# Patient Record
Sex: MALE | Race: WHITE | NOT HISPANIC OR LATINO | Employment: OTHER | URBAN - METROPOLITAN AREA
[De-identification: names, ages, dates, MRNs, and addresses within clinical notes are randomized per-mention and may not be internally consistent; named-entity substitution may affect disease eponyms.]

---

## 2017-02-13 ENCOUNTER — OUTSIDE FACILITY SERVICE (OUTPATIENT)
Dept: CARDIAC SURGERY | Facility: CLINIC | Age: 57
End: 2017-02-13

## 2017-02-13 PROCEDURE — G0180 MD CERTIFICATION HHA PATIENT: HCPCS | Performed by: THORACIC SURGERY (CARDIOTHORACIC VASCULAR SURGERY)

## 2018-07-03 ENCOUNTER — HOSPITAL ENCOUNTER (OUTPATIENT)
Age: 58
End: 2018-07-03
Payer: MEDICARE

## 2018-07-03 DIAGNOSIS — Z01.818: Primary | ICD-10-CM

## 2018-07-03 DIAGNOSIS — L03.90: ICD-10-CM

## 2018-07-03 DIAGNOSIS — L02.91: ICD-10-CM

## 2018-07-03 LAB
ANION GAP SERPL CALC-SCNC: 11.8 MEQ/L (ref 5–15)
BUN SERPL-MCNC: 4 MG/DL (ref 7–18)
CALCIUM SPEC-MCNC: 8.6 MG/DL (ref 8.5–10.1)
CHLORIDE SPEC-SCNC: 97 MMOL/L (ref 98–107)
CO2 SERPL-SCNC: 32 MMOL/L (ref 21–32)
CREAT BLD-SCNC: 0.96 MG/DL (ref 0.7–1.3)
ESTIMATED GLOMERULAR FILT RATE: 81 ML/MIN (ref 60–?)
GFR (AFRICAN AMERICAN): 98 ML/MIN (ref 60–?)
GLUCOSE: 294 MG/DL (ref 74–106)
HCT VFR BLD CALC: 53.5 % (ref 42–52)
HGB BLD-MCNC: 16.6 G/DL (ref 14.1–18)
MCHC RBC-ENTMCNC: 31 G/DL (ref 31.8–35.4)
MCV RBC: 94.4 FL (ref 80–94)
MEAN CORPUSCULAR HEMOGLOBIN: 29.3 PG (ref 27–31.2)
PLATELET # BLD: 220 K/MM3 (ref 142–424)
POTASSIUM: 3.8 MMOL/L (ref 3.5–5.1)
RBC # BLD AUTO: 5.67 M/MM3 (ref 4.6–6.2)
SODIUM SPEC-SCNC: 137 MMOL/L (ref 136–145)
WBC # BLD AUTO: 9.6 K/MM3 (ref 4.8–10.8)

## 2018-07-03 PROCEDURE — 80048 BASIC METABOLIC PNL TOTAL CA: CPT

## 2018-07-03 PROCEDURE — 36415 COLL VENOUS BLD VENIPUNCTURE: CPT

## 2018-07-03 PROCEDURE — 85025 COMPLETE CBC W/AUTO DIFF WBC: CPT

## 2018-07-03 PROCEDURE — 93005 ELECTROCARDIOGRAM TRACING: CPT

## 2018-11-24 ENCOUNTER — LAB REQUISITION (OUTPATIENT)
Dept: LAB | Facility: HOSPITAL | Age: 58
End: 2018-11-24

## 2018-11-24 DIAGNOSIS — Z00.00 ROUTINE GENERAL MEDICAL EXAMINATION AT A HEALTH CARE FACILITY: ICD-10-CM

## 2018-11-24 LAB
ALBUMIN SERPL-MCNC: 4.27 G/DL (ref 3.2–4.8)
ALBUMIN SERPL-MCNC: 4.27 G/DL (ref 3.2–4.8)
ALBUMIN/GLOB SERPL: 1.1 G/DL (ref 1.5–2.5)
ALP SERPL-CCNC: 128 U/L (ref 25–100)
ALP SERPL-CCNC: 128 U/L (ref 25–100)
ALT SERPL W P-5'-P-CCNC: 13 U/L (ref 7–40)
ALT SERPL W P-5'-P-CCNC: 13 U/L (ref 7–40)
ANION GAP SERPL CALCULATED.3IONS-SCNC: 4 MMOL/L (ref 3–11)
AST SERPL-CCNC: 15 U/L (ref 0–33)
AST SERPL-CCNC: 15 U/L (ref 0–33)
BASOPHILS # BLD AUTO: 0.06 10*3/MM3 (ref 0–0.2)
BASOPHILS NFR BLD AUTO: 0.5 % (ref 0–1)
BILIRUB CONJ SERPL-MCNC: 0.1 MG/DL (ref 0–0.2)
BILIRUB INDIRECT SERPL-MCNC: 0.3 MG/DL (ref 0.1–1.1)
BILIRUB SERPL-MCNC: 0.4 MG/DL (ref 0.3–1.2)
BILIRUB SERPL-MCNC: 0.4 MG/DL (ref 0.3–1.2)
BUN BLD-MCNC: 9 MG/DL (ref 9–23)
BUN/CREAT SERPL: 10.7 (ref 7–25)
CALCIUM SPEC-SCNC: 9.7 MG/DL (ref 8.7–10.4)
CHLORIDE SERPL-SCNC: 101 MMOL/L (ref 99–109)
CO2 SERPL-SCNC: 32 MMOL/L (ref 20–31)
CREAT BLD-MCNC: 0.84 MG/DL (ref 0.6–1.3)
DEPRECATED RDW RBC AUTO: 49.8 FL (ref 37–54)
EOSINOPHIL # BLD AUTO: 0.15 10*3/MM3 (ref 0–0.3)
EOSINOPHIL NFR BLD AUTO: 1.3 % (ref 0–3)
ERYTHROCYTE [DISTWIDTH] IN BLOOD BY AUTOMATED COUNT: 14 % (ref 11.3–14.5)
GFR SERPL CREATININE-BSD FRML MDRD: 94 ML/MIN/1.73
GLOBULIN UR ELPH-MCNC: 3.9 GM/DL
GLUCOSE BLD-MCNC: 211 MG/DL (ref 70–100)
HCT VFR BLD AUTO: 54.4 % (ref 38.9–50.9)
HGB BLD-MCNC: 18.2 G/DL (ref 13.1–17.5)
IMM GRANULOCYTES # BLD: 0.04 10*3/MM3 (ref 0–0.03)
IMM GRANULOCYTES NFR BLD: 0.4 % (ref 0–0.6)
LYMPHOCYTES # BLD AUTO: 3.22 10*3/MM3 (ref 0.6–4.8)
LYMPHOCYTES NFR BLD AUTO: 28.6 % (ref 24–44)
MCH RBC QN AUTO: 32.6 PG (ref 27–31)
MCHC RBC AUTO-ENTMCNC: 33.5 G/DL (ref 32–36)
MCV RBC AUTO: 97.3 FL (ref 80–99)
MONOCYTES # BLD AUTO: 0.74 10*3/MM3 (ref 0–1)
MONOCYTES NFR BLD AUTO: 6.6 % (ref 0–12)
NEUTROPHILS # BLD AUTO: 7.08 10*3/MM3 (ref 1.5–8.3)
NEUTROPHILS NFR BLD AUTO: 63 % (ref 41–71)
PLATELET # BLD AUTO: 240 10*3/MM3 (ref 150–450)
PMV BLD AUTO: 11.3 FL (ref 6–12)
POTASSIUM BLD-SCNC: 4.6 MMOL/L (ref 3.5–5.5)
PROT SERPL-MCNC: 8.2 G/DL (ref 5.7–8.2)
PROT SERPL-MCNC: 8.2 G/DL (ref 5.7–8.2)
RBC # BLD AUTO: 5.59 10*6/MM3 (ref 4.2–5.76)
SODIUM BLD-SCNC: 137 MMOL/L (ref 132–146)
WBC NRBC COR # BLD: 11.25 10*3/MM3 (ref 3.5–10.8)

## 2018-11-24 PROCEDURE — 80053 COMPREHEN METABOLIC PANEL: CPT | Performed by: PSYCHIATRY & NEUROLOGY

## 2018-11-24 PROCEDURE — 85025 COMPLETE CBC W/AUTO DIFF WBC: CPT | Performed by: PSYCHIATRY & NEUROLOGY

## 2018-11-24 PROCEDURE — 80076 HEPATIC FUNCTION PANEL: CPT | Performed by: PSYCHIATRY & NEUROLOGY

## 2018-12-02 ENCOUNTER — HOSPITAL ENCOUNTER (OUTPATIENT)
Facility: HOSPITAL | Age: 58
Setting detail: OBSERVATION
Discharge: HOME | End: 2018-12-03
Attending: EMERGENCY MEDICINE | Admitting: EMERGENCY MEDICINE

## 2018-12-02 ENCOUNTER — APPOINTMENT (OUTPATIENT)
Dept: CT IMAGING | Facility: HOSPITAL | Age: 58
End: 2018-12-02

## 2018-12-02 ENCOUNTER — APPOINTMENT (OUTPATIENT)
Dept: GENERAL RADIOLOGY | Facility: HOSPITAL | Age: 58
End: 2018-12-02

## 2018-12-02 DIAGNOSIS — R09.02 HYPOXIA: ICD-10-CM

## 2018-12-02 DIAGNOSIS — R07.9 CHEST PAIN, UNSPECIFIED TYPE: Primary | ICD-10-CM

## 2018-12-02 DIAGNOSIS — J44.9 CHRONIC OBSTRUCTIVE PULMONARY DISEASE, UNSPECIFIED COPD TYPE (HCC): ICD-10-CM

## 2018-12-02 PROBLEM — E66.2 OBESITY HYPOVENTILATION SYNDROME (HCC): Status: RESOLVED | Noted: 2018-12-02 | Resolved: 2018-12-02

## 2018-12-02 PROBLEM — J43.2 CENTRILOBULAR EMPHYSEMA (HCC): Status: RESOLVED | Noted: 2018-12-02 | Resolved: 2018-12-02

## 2018-12-02 PROBLEM — J41.1 MUCOPURULENT CHRONIC BRONCHITIS (HCC): Status: ACTIVE | Noted: 2018-12-02

## 2018-12-02 PROBLEM — F11.10 OPIOID ABUSE: Status: ACTIVE | Noted: 2018-12-02

## 2018-12-02 PROBLEM — J44.1 COPD WITH ACUTE EXACERBATION (HCC): Status: ACTIVE | Noted: 2018-12-02

## 2018-12-02 PROBLEM — E66.2 OBESITY HYPOVENTILATION SYNDROME (HCC): Status: ACTIVE | Noted: 2018-12-02

## 2018-12-02 PROBLEM — J44.1 COPD EXACERBATION (HCC): Status: ACTIVE | Noted: 2018-12-02

## 2018-12-02 PROBLEM — E11.9 DM2 (DIABETES MELLITUS, TYPE 2): Status: ACTIVE | Noted: 2018-12-02

## 2018-12-02 PROBLEM — K21.9 GERD (GASTROESOPHAGEAL REFLUX DISEASE): Status: ACTIVE | Noted: 2018-12-02

## 2018-12-02 LAB
ALBUMIN SERPL-MCNC: 4.07 G/DL (ref 3.2–4.8)
ALBUMIN/GLOB SERPL: 1.2 G/DL (ref 1.5–2.5)
ALP SERPL-CCNC: 102 U/L (ref 25–100)
ALT SERPL W P-5'-P-CCNC: 13 U/L (ref 7–40)
ANION GAP SERPL CALCULATED.3IONS-SCNC: 8 MMOL/L (ref 3–11)
AST SERPL-CCNC: 18 U/L (ref 0–33)
BASOPHILS # BLD AUTO: 0.08 10*3/MM3 (ref 0–0.2)
BASOPHILS NFR BLD AUTO: 0.7 % (ref 0–1)
BILIRUB SERPL-MCNC: 0.4 MG/DL (ref 0.3–1.2)
BNP SERPL-MCNC: 2 PG/ML (ref 0–100)
BUN BLD-MCNC: 11 MG/DL (ref 9–23)
BUN/CREAT SERPL: 10.5 (ref 7–25)
CALCIUM SPEC-SCNC: 9 MG/DL (ref 8.7–10.4)
CHLORIDE SERPL-SCNC: 103 MMOL/L (ref 99–109)
CO2 SERPL-SCNC: 24 MMOL/L (ref 20–31)
CREAT BLD-MCNC: 1.05 MG/DL (ref 0.6–1.3)
DEPRECATED RDW RBC AUTO: 49.1 FL (ref 37–54)
EOSINOPHIL # BLD AUTO: 0.3 10*3/MM3 (ref 0–0.3)
EOSINOPHIL NFR BLD AUTO: 2.5 % (ref 0–3)
ERYTHROCYTE [DISTWIDTH] IN BLOOD BY AUTOMATED COUNT: 13.8 % (ref 11.3–14.5)
GFR SERPL CREATININE-BSD FRML MDRD: 73 ML/MIN/1.73
GLOBULIN UR ELPH-MCNC: 3.5 GM/DL
GLUCOSE BLD-MCNC: 219 MG/DL (ref 70–100)
GLUCOSE BLDC GLUCOMTR-MCNC: 322 MG/DL (ref 70–130)
HCT VFR BLD AUTO: 54.3 % (ref 38.9–50.9)
HGB BLD-MCNC: 17.9 G/DL (ref 13.1–17.5)
HOLD SPECIMEN: NORMAL
HOLD SPECIMEN: NORMAL
IMM GRANULOCYTES # BLD: 0.05 10*3/MM3 (ref 0–0.03)
IMM GRANULOCYTES NFR BLD: 0.4 % (ref 0–0.6)
LIPASE SERPL-CCNC: 30 U/L (ref 6–51)
LYMPHOCYTES # BLD AUTO: 3.44 10*3/MM3 (ref 0.6–4.8)
LYMPHOCYTES NFR BLD AUTO: 28.7 % (ref 24–44)
MCH RBC QN AUTO: 32 PG (ref 27–31)
MCHC RBC AUTO-ENTMCNC: 33 G/DL (ref 32–36)
MCV RBC AUTO: 97.1 FL (ref 80–99)
MONOCYTES # BLD AUTO: 0.42 10*3/MM3 (ref 0–1)
MONOCYTES NFR BLD AUTO: 3.5 % (ref 0–12)
NEUTROPHILS # BLD AUTO: 7.76 10*3/MM3 (ref 1.5–8.3)
NEUTROPHILS NFR BLD AUTO: 64.6 % (ref 41–71)
PLATELET # BLD AUTO: 213 10*3/MM3 (ref 150–450)
PMV BLD AUTO: 10.4 FL (ref 6–12)
POTASSIUM BLD-SCNC: 4.4 MMOL/L (ref 3.5–5.5)
PROT SERPL-MCNC: 7.6 G/DL (ref 5.7–8.2)
RBC # BLD AUTO: 5.59 10*6/MM3 (ref 4.2–5.76)
SODIUM BLD-SCNC: 135 MMOL/L (ref 132–146)
TROPONIN I SERPL-MCNC: 0 NG/ML (ref 0–0.07)
TROPONIN I SERPL-MCNC: <0.006 NG/ML
WBC NRBC COR # BLD: 12 10*3/MM3 (ref 3.5–10.8)
WHOLE BLOOD HOLD SPECIMEN: NORMAL
WHOLE BLOOD HOLD SPECIMEN: NORMAL

## 2018-12-02 PROCEDURE — 84484 ASSAY OF TROPONIN QUANT: CPT | Performed by: EMERGENCY MEDICINE

## 2018-12-02 PROCEDURE — 63710000001 INSULIN LISPRO (HUMAN) PER 5 UNITS: Performed by: FAMILY MEDICINE

## 2018-12-02 PROCEDURE — G0378 HOSPITAL OBSERVATION PER HR: HCPCS

## 2018-12-02 PROCEDURE — 80053 COMPREHEN METABOLIC PANEL: CPT | Performed by: EMERGENCY MEDICINE

## 2018-12-02 PROCEDURE — G0008 ADMIN INFLUENZA VIRUS VAC: HCPCS | Performed by: FAMILY MEDICINE

## 2018-12-02 PROCEDURE — 94799 UNLISTED PULMONARY SVC/PX: CPT

## 2018-12-02 PROCEDURE — 99220 PR INITIAL OBSERVATION CARE/DAY 70 MINUTES: CPT | Performed by: FAMILY MEDICINE

## 2018-12-02 PROCEDURE — 83880 ASSAY OF NATRIURETIC PEPTIDE: CPT | Performed by: EMERGENCY MEDICINE

## 2018-12-02 PROCEDURE — 0 IOPAMIDOL PER 1 ML: Performed by: EMERGENCY MEDICINE

## 2018-12-02 PROCEDURE — 82962 GLUCOSE BLOOD TEST: CPT

## 2018-12-02 PROCEDURE — 85025 COMPLETE CBC W/AUTO DIFF WBC: CPT | Performed by: EMERGENCY MEDICINE

## 2018-12-02 PROCEDURE — 71275 CT ANGIOGRAPHY CHEST: CPT

## 2018-12-02 PROCEDURE — 25010000002 INFLUENZA VAC SUBUNIT QUAD 0.5 ML SUSPENSION PREFILLED SYRINGE: Performed by: FAMILY MEDICINE

## 2018-12-02 PROCEDURE — 71045 X-RAY EXAM CHEST 1 VIEW: CPT

## 2018-12-02 PROCEDURE — 36600 WITHDRAWAL OF ARTERIAL BLOOD: CPT

## 2018-12-02 PROCEDURE — 93005 ELECTROCARDIOGRAM TRACING: CPT | Performed by: EMERGENCY MEDICINE

## 2018-12-02 PROCEDURE — 84484 ASSAY OF TROPONIN QUANT: CPT

## 2018-12-02 PROCEDURE — 96374 THER/PROPH/DIAG INJ IV PUSH: CPT

## 2018-12-02 PROCEDURE — 25010000002 ENOXAPARIN PER 10 MG: Performed by: FAMILY MEDICINE

## 2018-12-02 PROCEDURE — 83690 ASSAY OF LIPASE: CPT | Performed by: EMERGENCY MEDICINE

## 2018-12-02 PROCEDURE — 99285 EMERGENCY DEPT VISIT HI MDM: CPT

## 2018-12-02 PROCEDURE — 25010000002 METHYLPREDNISOLONE PER 125 MG: Performed by: PHYSICIAN ASSISTANT

## 2018-12-02 PROCEDURE — 82805 BLOOD GASES W/O2 SATURATION: CPT

## 2018-12-02 PROCEDURE — 96372 THER/PROPH/DIAG INJ SC/IM: CPT

## 2018-12-02 PROCEDURE — 90661 CCIIV3 VAC ABX FR 0.5 ML IM: CPT | Performed by: FAMILY MEDICINE

## 2018-12-02 PROCEDURE — 94640 AIRWAY INHALATION TREATMENT: CPT

## 2018-12-02 RX ORDER — IPRATROPIUM BROMIDE AND ALBUTEROL SULFATE 2.5; .5 MG/3ML; MG/3ML
3 SOLUTION RESPIRATORY (INHALATION) ONCE
Status: COMPLETED | OUTPATIENT
Start: 2018-12-02 | End: 2018-12-02

## 2018-12-02 RX ORDER — NICOTINE POLACRILEX 4 MG
15 LOZENGE BUCCAL
Status: DISCONTINUED | OUTPATIENT
Start: 2018-12-02 | End: 2018-12-03 | Stop reason: HOSPADM

## 2018-12-02 RX ORDER — LOPERAMIDE HYDROCHLORIDE 2 MG/1
2 CAPSULE ORAL 4 TIMES DAILY PRN
COMMUNITY

## 2018-12-02 RX ORDER — ONDANSETRON 4 MG/1
4 TABLET, FILM COATED ORAL EVERY 8 HOURS PRN
Status: DISCONTINUED | OUTPATIENT
Start: 2018-12-02 | End: 2018-12-03 | Stop reason: HOSPADM

## 2018-12-02 RX ORDER — BUPRENORPHINE HYDROCHLORIDE AND NALOXONE HYDROCHLORIDE DIHYDRATE 2; .5 MG/1; MG/1
1 TABLET SUBLINGUAL 2 TIMES DAILY
COMMUNITY

## 2018-12-02 RX ORDER — PROMETHAZINE HYDROCHLORIDE 25 MG/1
25 TABLET ORAL EVERY 6 HOURS PRN
COMMUNITY

## 2018-12-02 RX ORDER — ACETAMINOPHEN 325 MG/1
650 TABLET ORAL EVERY 6 HOURS PRN
COMMUNITY

## 2018-12-02 RX ORDER — ASPIRIN 81 MG/1
324 TABLET, CHEWABLE ORAL ONCE
Status: DISCONTINUED | OUTPATIENT
Start: 2018-12-02 | End: 2018-12-03 | Stop reason: HOSPADM

## 2018-12-02 RX ORDER — DULOXETIN HYDROCHLORIDE 60 MG/1
60 CAPSULE, DELAYED RELEASE ORAL DAILY
Status: DISCONTINUED | OUTPATIENT
Start: 2018-12-03 | End: 2018-12-03 | Stop reason: HOSPADM

## 2018-12-02 RX ORDER — SODIUM CHLORIDE 0.9 % (FLUSH) 0.9 %
10 SYRINGE (ML) INJECTION AS NEEDED
Status: DISCONTINUED | OUTPATIENT
Start: 2018-12-02 | End: 2018-12-03 | Stop reason: HOSPADM

## 2018-12-02 RX ORDER — SODIUM CHLORIDE 9 MG/ML
50 INJECTION, SOLUTION INTRAVENOUS CONTINUOUS
Status: DISCONTINUED | OUTPATIENT
Start: 2018-12-02 | End: 2018-12-03 | Stop reason: HOSPADM

## 2018-12-02 RX ORDER — DICYCLOMINE HYDROCHLORIDE 10 MG/1
10 CAPSULE ORAL
COMMUNITY

## 2018-12-02 RX ORDER — ATORVASTATIN CALCIUM 20 MG/1
20 TABLET, FILM COATED ORAL DAILY
COMMUNITY

## 2018-12-02 RX ORDER — ASPIRIN 81 MG/1
81 TABLET, CHEWABLE ORAL DAILY
Status: DISCONTINUED | OUTPATIENT
Start: 2018-12-03 | End: 2018-12-03 | Stop reason: HOSPADM

## 2018-12-02 RX ORDER — SODIUM CHLORIDE 0.9 % (FLUSH) 0.9 %
3-10 SYRINGE (ML) INJECTION AS NEEDED
Status: DISCONTINUED | OUTPATIENT
Start: 2018-12-02 | End: 2018-12-03 | Stop reason: HOSPADM

## 2018-12-02 RX ORDER — ACETAMINOPHEN 325 MG/1
650 TABLET ORAL EVERY 6 HOURS PRN
Status: DISCONTINUED | OUTPATIENT
Start: 2018-12-02 | End: 2018-12-03 | Stop reason: HOSPADM

## 2018-12-02 RX ORDER — NICOTINE 21 MG/24HR
1 PATCH, TRANSDERMAL 24 HOURS TRANSDERMAL
Status: DISCONTINUED | OUTPATIENT
Start: 2018-12-02 | End: 2018-12-03 | Stop reason: HOSPADM

## 2018-12-02 RX ORDER — DOXYCYCLINE 100 MG/1
100 CAPSULE ORAL EVERY 12 HOURS
Status: DISCONTINUED | OUTPATIENT
Start: 2018-12-02 | End: 2018-12-03 | Stop reason: HOSPADM

## 2018-12-02 RX ORDER — PANTOPRAZOLE SODIUM 40 MG/1
40 TABLET, DELAYED RELEASE ORAL EVERY MORNING
Status: DISCONTINUED | OUTPATIENT
Start: 2018-12-03 | End: 2018-12-03 | Stop reason: HOSPADM

## 2018-12-02 RX ORDER — BUDESONIDE 0.5 MG/2ML
0.5 INHALANT ORAL
Status: DISCONTINUED | OUTPATIENT
Start: 2018-12-02 | End: 2018-12-03 | Stop reason: HOSPADM

## 2018-12-02 RX ORDER — CYCLOBENZAPRINE HCL 10 MG
10 TABLET ORAL 3 TIMES DAILY PRN
COMMUNITY

## 2018-12-02 RX ORDER — ATORVASTATIN CALCIUM 10 MG/1
10 TABLET, FILM COATED ORAL NIGHTLY
Status: DISCONTINUED | OUTPATIENT
Start: 2018-12-02 | End: 2018-12-03 | Stop reason: HOSPADM

## 2018-12-02 RX ORDER — ONDANSETRON 4 MG/1
4 TABLET, FILM COATED ORAL EVERY 8 HOURS PRN
COMMUNITY

## 2018-12-02 RX ORDER — DOCUSATE SODIUM 100 MG/1
100 CAPSULE, LIQUID FILLED ORAL 2 TIMES DAILY
COMMUNITY

## 2018-12-02 RX ORDER — HYDROXYZINE 50 MG/1
50 TABLET, FILM COATED ORAL 3 TIMES DAILY PRN
COMMUNITY

## 2018-12-02 RX ORDER — BISACODYL 5 MG/1
5 TABLET, DELAYED RELEASE ORAL DAILY PRN
COMMUNITY

## 2018-12-02 RX ORDER — TAMSULOSIN HYDROCHLORIDE 0.4 MG/1
0.4 CAPSULE ORAL DAILY
Status: DISCONTINUED | OUTPATIENT
Start: 2018-12-03 | End: 2018-12-03 | Stop reason: HOSPADM

## 2018-12-02 RX ORDER — BUPRENORPHINE HYDROCHLORIDE AND NALOXONE HYDROCHLORIDE DIHYDRATE 2; .5 MG/1; MG/1
1 TABLET SUBLINGUAL 2 TIMES DAILY
Status: DISCONTINUED | OUTPATIENT
Start: 2018-12-02 | End: 2018-12-03 | Stop reason: HOSPADM

## 2018-12-02 RX ORDER — CLONIDINE HYDROCHLORIDE 0.1 MG/1
0.1 TABLET ORAL EVERY 6 HOURS PRN
COMMUNITY

## 2018-12-02 RX ORDER — DEXTROSE MONOHYDRATE 25 G/50ML
25 INJECTION, SOLUTION INTRAVENOUS
Status: DISCONTINUED | OUTPATIENT
Start: 2018-12-02 | End: 2018-12-03 | Stop reason: HOSPADM

## 2018-12-02 RX ORDER — CLOPIDOGREL BISULFATE 75 MG/1
75 TABLET ORAL DAILY
Status: DISCONTINUED | OUTPATIENT
Start: 2018-12-03 | End: 2018-12-03 | Stop reason: HOSPADM

## 2018-12-02 RX ORDER — GABAPENTIN 400 MG/1
800 CAPSULE ORAL EVERY 8 HOURS SCHEDULED
Status: DISCONTINUED | OUTPATIENT
Start: 2018-12-02 | End: 2018-12-03 | Stop reason: HOSPADM

## 2018-12-02 RX ORDER — SODIUM CHLORIDE 0.9 % (FLUSH) 0.9 %
3 SYRINGE (ML) INJECTION EVERY 12 HOURS SCHEDULED
Status: DISCONTINUED | OUTPATIENT
Start: 2018-12-02 | End: 2018-12-03 | Stop reason: HOSPADM

## 2018-12-02 RX ORDER — HYDROXYZINE HYDROCHLORIDE 25 MG/1
50 TABLET, FILM COATED ORAL 3 TIMES DAILY PRN
Status: DISCONTINUED | OUTPATIENT
Start: 2018-12-02 | End: 2018-12-03 | Stop reason: HOSPADM

## 2018-12-02 RX ORDER — TRAZODONE HYDROCHLORIDE 50 MG/1
50 TABLET ORAL NIGHTLY PRN
COMMUNITY

## 2018-12-02 RX ORDER — CLONIDINE HYDROCHLORIDE 0.1 MG/1
0.1 TABLET ORAL EVERY 6 HOURS PRN
Status: DISCONTINUED | OUTPATIENT
Start: 2018-12-02 | End: 2018-12-03 | Stop reason: HOSPADM

## 2018-12-02 RX ORDER — TRAZODONE HYDROCHLORIDE 50 MG/1
50 TABLET ORAL NIGHTLY PRN
Status: DISCONTINUED | OUTPATIENT
Start: 2018-12-02 | End: 2018-12-03 | Stop reason: HOSPADM

## 2018-12-02 RX ORDER — CYCLOBENZAPRINE HCL 10 MG
10 TABLET ORAL 3 TIMES DAILY PRN
Status: DISCONTINUED | OUTPATIENT
Start: 2018-12-02 | End: 2018-12-03 | Stop reason: HOSPADM

## 2018-12-02 RX ORDER — MAGNESIUM HYDROXIDE/ALUMINUM HYDROXICE/SIMETHICONE 120; 1200; 1200 MG/30ML; MG/30ML; MG/30ML
30 SUSPENSION ORAL EVERY 6 HOURS PRN
COMMUNITY

## 2018-12-02 RX ORDER — PREDNISONE 20 MG/1
40 TABLET ORAL
Status: DISCONTINUED | OUTPATIENT
Start: 2018-12-03 | End: 2018-12-03 | Stop reason: HOSPADM

## 2018-12-02 RX ORDER — METHYLPREDNISOLONE SODIUM SUCCINATE 125 MG/2ML
125 INJECTION, POWDER, LYOPHILIZED, FOR SOLUTION INTRAMUSCULAR; INTRAVENOUS ONCE
Status: COMPLETED | OUTPATIENT
Start: 2018-12-02 | End: 2018-12-02

## 2018-12-02 RX ORDER — PROMETHAZINE HYDROCHLORIDE 25 MG/1
25 SUPPOSITORY RECTAL EVERY 6 HOURS PRN
COMMUNITY

## 2018-12-02 RX ORDER — IPRATROPIUM BROMIDE AND ALBUTEROL SULFATE 2.5; .5 MG/3ML; MG/3ML
3 SOLUTION RESPIRATORY (INHALATION) EVERY 4 HOURS PRN
Status: DISCONTINUED | OUTPATIENT
Start: 2018-12-02 | End: 2018-12-03 | Stop reason: HOSPADM

## 2018-12-02 RX ORDER — CITALOPRAM 20 MG/1
30 TABLET ORAL DAILY
COMMUNITY

## 2018-12-02 RX ORDER — IPRATROPIUM BROMIDE AND ALBUTEROL SULFATE 2.5; .5 MG/3ML; MG/3ML
3 SOLUTION RESPIRATORY (INHALATION)
Status: DISCONTINUED | OUTPATIENT
Start: 2018-12-02 | End: 2018-12-03 | Stop reason: HOSPADM

## 2018-12-02 RX ORDER — DOCUSATE SODIUM 100 MG/1
100 CAPSULE, LIQUID FILLED ORAL DAILY
Status: DISCONTINUED | OUTPATIENT
Start: 2018-12-02 | End: 2018-12-03 | Stop reason: HOSPADM

## 2018-12-02 RX ADMIN — SODIUM CHLORIDE, PRESERVATIVE FREE 3 ML: 5 INJECTION INTRAVENOUS at 21:27

## 2018-12-02 RX ADMIN — HYDROXYZINE HYDROCHLORIDE 50 MG: 25 TABLET, FILM COATED ORAL at 22:21

## 2018-12-02 RX ADMIN — INSULIN LISPRO 7 UNITS: 100 INJECTION, SOLUTION INTRAVENOUS; SUBCUTANEOUS at 21:30

## 2018-12-02 RX ADMIN — SODIUM CHLORIDE 1000 ML: 9 INJECTION, SOLUTION INTRAVENOUS at 14:24

## 2018-12-02 RX ADMIN — ENOXAPARIN SODIUM 40 MG: 40 INJECTION SUBCUTANEOUS at 21:26

## 2018-12-02 RX ADMIN — NITROGLYCERIN 1 INCH: 20 OINTMENT TOPICAL at 10:56

## 2018-12-02 RX ADMIN — BUPRENORPHINE HYDROCHLORIDE AND NALOXONE HYDROCHLORIDE DIHYDRATE 1 TABLET: 2; .5 TABLET SUBLINGUAL at 21:26

## 2018-12-02 RX ADMIN — ATORVASTATIN CALCIUM 10 MG: 10 TABLET, FILM COATED ORAL at 21:26

## 2018-12-02 RX ADMIN — DOXYCYCLINE 100 MG: 100 CAPSULE ORAL at 19:41

## 2018-12-02 RX ADMIN — IPRATROPIUM BROMIDE AND ALBUTEROL SULFATE 3 ML: 2.5; .5 SOLUTION RESPIRATORY (INHALATION) at 14:04

## 2018-12-02 RX ADMIN — METHYLPREDNISOLONE SODIUM SUCCINATE 125 MG: 125 INJECTION, POWDER, FOR SOLUTION INTRAMUSCULAR; INTRAVENOUS at 15:06

## 2018-12-02 RX ADMIN — GABAPENTIN 800 MG: 400 CAPSULE ORAL at 21:26

## 2018-12-02 RX ADMIN — INFLUENZA A VIRUS A/SINGAPORE/GP1908/2015 IVR-180 (H1N1) ANTIGEN (MDCK CELL DERIVED, PROPIOLACTONE INACTIVATED), INFLUENZA A VIRUS A/NORTH CAROLINA/04/2016 (H3N2) HEMAGGLUTININ ANTIGEN (MDCK CELL DERIVED, PROPIOLACTONE INACTIVATED), INFLUENZA B VIRUS B/IOWA/06/2017 HEMAGGLUTININ ANTIGEN (MDCK CELL DERIVED, PROPIOLACTONE INACTIVATED), INFLUENZA B VIRUS B/SINGAPORE/INFTT-16-0610/2016 HEMAGGLUTININ ANTIGEN (MDCK CELL DERIVED, PROPIOLACTONE INACTIVATED) 0.5 ML: 15; 15; 15; 15 INJECTION, SUSPENSION INTRAMUSCULAR at 21:27

## 2018-12-02 RX ADMIN — IPRATROPIUM BROMIDE AND ALBUTEROL SULFATE 3 ML: .5; 3 SOLUTION RESPIRATORY (INHALATION) at 15:07

## 2018-12-02 RX ADMIN — ACETAMINOPHEN 650 MG: 325 TABLET ORAL at 21:26

## 2018-12-02 RX ADMIN — IOPAMIDOL 63 ML: 755 INJECTION, SOLUTION INTRAVENOUS at 12:03

## 2018-12-02 NOTE — ED PROVIDER NOTES
Subjective   Patient comes to the emergency part today if she comes from the [place] complaining of shortness of breath and chest pain.  Patient reports he had abrupt onset of chest pain and shortness of breath.  Patient was initially tachycardic and having a hard time breathing.  Patient does have a history of COPD does continue to smoke.  He's currently in the [place] for opioid abuse however never injected only snorted.  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  He is a renal stent and 2 stents in the heart sometime back.  Patient had no fevers no chills no upper respiratory symptoms.  He has had a PE in the past.  Patient states that he is supposed to be discharged from the [place] tomorrow barring any complications.  Nothing seems to make his shortness of breath worse is exertion chest pain is alleviated with one nitroglycerin.  Pain was alleviated with one nitroglycerin.        History provided by:  Patient   used: No    Chest Pain   Pain location:  Substernal area  Pain quality: pressure and tightness    Pain radiates to:  Does not radiate  Pain severity:  Moderate  Onset quality:  Sudden  Duration:  2 hours  Timing:  Constant  Progression:  Resolved  Chronicity:  New  Context: breathing and movement    Context: not eating, not raising an arm, not at rest, not stress and not trauma    Relieved by:  Nitroglycerin  Worsened by:  Nothing  Ineffective treatments:  None tried  Associated symptoms: shortness of breath    Associated symptoms: no abdominal pain, no anxiety, no back pain, no claudication, no cough, no fever, no headache, no nausea, no near-syncope, no numbness, no orthopnea, no palpitations, no vomiting and no weakness    Risk factors: coronary artery disease, diabetes mellitus, hypertension, male sex and smoking    Risk factors: no aortic disease        Review of Systems   Constitutional: Negative for fever.    Respiratory: Positive for chest tightness and shortness of breath. Negative for cough.    Cardiovascular: Positive for chest pain. Negative for palpitations, orthopnea, claudication and near-syncope.   Gastrointestinal: Negative for abdominal pain, diarrhea, nausea and vomiting.   Genitourinary: Negative for dysuria, frequency and urgency.   Musculoskeletal: Negative for back pain and neck pain.   Skin: Negative for pallor and rash.   Neurological: Negative for weakness, numbness and headaches.   Hematological: Negative for adenopathy.   Psychiatric/Behavioral: Negative.    All other systems reviewed and are negative.      Past Medical History:   Diagnosis Date   • CAD (coronary artery disease)    • COPD (chronic obstructive pulmonary disease) (CMS/[facility])    • Deep vein thrombosis of lower extremity (CMS/[facility])    • DM2 (diabetes mellitus, type 2) (CMS/[facility])    • GERD (gastroesophageal reflux disease)    • Hypertension    • Opioid abuse (CMS/[facility])    • PVD (peripheral vascular disease) (CMS/[facility])        No Known Allergies    Past Surgical History:   Procedure Laterality Date   • ABOVE KNEE AMPUTATION Left    • CAROTID ENDARTERECTOMY     • CERVICAL SPINE SURGERY     • CORONARY ANGIOPLASTY WITH STENT PLACEMENT     • ILIAC ARTERY - FEMORAL ARTERY BYPASS GRAFT     • ILIAC VEIN ANGIOPLASTY / STENTING     • INGUINAL HERNIA REPAIR         Family History   Problem Relation Age of Onset   • Hypertension Mother    • Diabetes Mother    • Cirrhosis Mother    • Hypertension Father    • Heart attack Father    • COPD Father    • Coronary artery disease Father    • Diabetes Father        Social History     Socioeconomic History   • Marital status:      Spouse name: N/A   • Number of children: Not on file   • Years of education: H.S.   • Highest education level: High school graduate   Occupational History   • Occupation:      Employer: DISABLED   Tobacco Use   • Smoking status: Current Every Day Smoker      Packs/day: 1.00     Years: 42.00     Pack years: 42.00     Types: Cigarettes   • Smokeless tobacco: Never Used   Substance and Sexual Activity   • Alcohol use: No   • Drug use: Yes     Types: Heroin   • Sexual activity: No           Objective   Physical Exam   Constitutional: He is oriented to person, place, and time. He appears well-developed and well-nourished.   HENT:   Head: Normocephalic and atraumatic.   Right Ear: External ear normal.   Left Ear: External ear normal.   Nose: Nose normal.   Mouth/Throat: Oropharynx is clear and moist.   Eyes: Conjunctivae and EOM are normal. Pupils are equal, round, and reactive to light. No scleral icterus.   Neck: Normal range of motion. No thyromegaly present.   Cardiovascular: Normal rate, regular rhythm and normal heart sounds. Exam reveals no S3, no S4 and no distant heart sounds.   No murmur heard.  Pulmonary/Chest: Effort normal. No respiratory distress. He has decreased breath sounds. He has wheezes (faint expiratory posterior lung fields). He has no rhonchi. He has no rales. He exhibits no tenderness.   Abdominal: Soft. Bowel sounds are normal. He exhibits no distension. There is no tenderness.   Musculoskeletal: Normal range of motion.   Lymphadenopathy:     He has no cervical adenopathy.   Neurological: He is alert and oriented to person, place, and time. He has normal reflexes. He displays normal reflexes. No cranial nerve deficit. Coordination normal.   Skin: Skin is warm and dry. Capillary refill takes less than 2 seconds.   Psychiatric: He has a normal mood and affect. His behavior is normal. Judgment and thought content normal.   Nursing note and vitals reviewed.      Procedures           ED Course  ED Course as of Dec 03 1745   Sun Dec 02, 2018   1440 In anticipation of him being discharged to turn his oxygen off.  He desatted to about 83% although he states he doesn't feel any worse.  This with a good plus.  Multiple readings of 83-87 at best.  He is put  back on his oxygen his oxygen sats turned up to about 95% again.  [ELIZABET]   1442 CT the chest was negative.  [ELIZABET]   1454 Status the patient with Dr. Garcia is agreed to admit the patient.  ABGs are being drawn and pending.  [ELIZABET]      ED Course User Index  [ELIZABET] Yvon Edmond PA          Recent Results (from the past 24 hour(s))   POC Glucose Once    Collection Time: 12/02/18  8:21 PM   Result Value Ref Range    Glucose 322 (H) 70 - 130 mg/dL   Basic Metabolic Panel    Collection Time: 12/03/18  4:41 AM   Result Value Ref Range    Glucose 255 (H) 70 - 100 mg/dL    BUN 13 9 - 23 mg/dL    Creatinine 0.90 0.60 - 1.30 mg/dL    Sodium 136 132 - 146 mmol/L    Potassium 5.3 3.5 - 5.5 mmol/L    Chloride 104 99 - 109 mmol/L    CO2 27.0 20.0 - 31.0 mmol/L    Calcium 9.3 8.7 - 10.4 mg/dL    eGFR Non African Amer 87 >60 mL/min/1.73    BUN/Creatinine Ratio 14.4 7.0 - 25.0    Anion Gap 5.0 3.0 - 11.0 mmol/L   CBC Auto Differential    Collection Time: 12/03/18  4:41 AM   Result Value Ref Range    WBC 17.96 (H) 3.50 - 10.80 10*3/mm3    RBC 5.24 4.20 - 5.76 10*6/mm3    Hemoglobin 16.7 13.1 - 17.5 g/dL    Hematocrit 51.0 (H) 38.9 - 50.9 %    MCV 97.3 80.0 - 99.0 fL    MCH 31.9 (H) 27.0 - 31.0 pg    MCHC 32.7 32.0 - 36.0 g/dL    RDW 13.7 11.3 - 14.5 %    RDW-SD 48.9 37.0 - 54.0 fl    MPV 10.9 6.0 - 12.0 fL    Platelets 219 150 - 450 10*3/mm3    Neutrophil % 88.1 (H) 41.0 - 71.0 %    Lymphocyte % 10.0 (L) 24.0 - 44.0 %    Monocyte % 1.8 0.0 - 12.0 %    Eosinophil % 0.0 0.0 - 3.0 %    Basophil % 0.1 0.0 - 1.0 %    Immature Grans % 0.3 0.0 - 0.6 %    Neutrophils, Absolute 15.81 (H) 1.50 - 8.30 10*3/mm3    Lymphocytes, Absolute 1.80 0.60 - 4.80 10*3/mm3    Monocytes, Absolute 0.33 0.00 - 1.00 10*3/mm3    Eosinophils, Absolute 0.00 0.00 - 0.30 10*3/mm3    Basophils, Absolute 0.02 0.00 - 0.20 10*3/mm3    Immature Grans, Absolute 0.05 (H) 0.00 - 0.03 10*3/mm3   Lipid Panel    Collection Time: 12/03/18  4:41 AM   Result Value Ref Range     Total Cholesterol 214 (H) 0 - 200 mg/dL    Triglycerides 158 (H) 0 - 150 mg/dL    HDL Cholesterol 29 (L) 40 - 60 mg/dL    LDL Cholesterol  183 (H) 0 - 130 mg/dL   Hemoglobin A1c    Collection Time: 12/03/18  4:41 AM   Result Value Ref Range    Hemoglobin A1C 10.40 (H) 4.80 - 5.60 %   TSH    Collection Time: 12/03/18  4:41 AM   Result Value Ref Range    TSH 0.387 0.350 - 5.350 mIU/mL   POC Glucose Once    Collection Time: 12/03/18  7:28 AM   Result Value Ref Range    Glucose 224 (H) 70 - 130 mg/dL   POC Glucose Once    Collection Time: 12/03/18 11:32 AM   Result Value Ref Range    Glucose 268 (H) 70 - 130 mg/dL     Note: In addition to lab results from this visit, the labs listed above may include labs taken at another facility or during a different encounter within the last 24 hours. Please correlate lab times with ED admission and discharge times for further clarification of the services performed during this visit.    CT Angiogram Chest With Contrast   Final Result   No CT evidence of acute intrathoracic abnormality.  No PE.       DICTATED:   12/2/2018   EDITED/ls :   12/2/2018        This report was finalized on 12/3/2018 3:31 PM by Dr. Lacie Nguyen MD.          XR Chest 1 View   Final Result   No acute cardiopulmonary disease.       DICTATED:   12/2/2018   EDITED/ls :   12/2/2018        This report was finalized on 12/3/2018 3:30 PM by Dr. Lacie Nguyen MD.            Vitals:    12/03/18 0712 12/03/18 0726 12/03/18 0850 12/03/18 1127   BP:  126/80 123/75    BP Location:       Patient Position:  Lying     Pulse: 77 107 102 81   Resp:  18     Temp:  97.8 °F (36.6 °C)     TempSrc:  Oral     SpO2: 93% 91% 92% 93%   Weight:       Height:         Medications   nitroglycerin (NITROSTAT) ointment 1 inch (1 inch Topical Given 12/2/18 1056)   iopamidol (ISOVUE-370) 76 % injection 100 mL (63 mL Intravenous Given 12/2/18 1203)   ipratropium-albuterol (DUO-NEB) nebulizer solution 3 mL (3 mL Nebulization Given  12/2/18 1404)   sodium chloride 0.9 % bolus 1,000 mL (0 mL Intravenous Stopped 12/2/18 1655)   methylPREDNISolone sodium succinate (SOLU-Medrol) injection 125 mg (125 mg Intravenous Given 12/2/18 1506)   ipratropium-albuterol (DUO-NEB) nebulizer solution 3 mL (3 mL Nebulization Given 12/2/18 1507)   Influenza Vac Subunit Quad (FLUCELVAX) injection 0.5 mL (0.5 mL Intramuscular Given 12/2/18 2127)     ECG/EMG Results (last 24 hours)     Procedure Component Value Units Date/Time    ECG 12 Lead [[id number]] Collected:  12/02/18 1004     Updated:  12/02/18 1005                MDM  Number of Diagnoses or Management Options  Chest pain, unspecified type: new and requires workup  Chronic obstructive pulmonary disease, unspecified COPD type (CMS/HCC): new and requires workup  Hypoxia: new and requires workup     Amount and/or Complexity of Data Reviewed  Clinical lab tests: ordered and reviewed  Tests in the radiology section of CPT®: ordered and reviewed  Tests in the medicine section of CPT®: reviewed and ordered  Discuss the patient with other providers: yes    Patient Progress  Patient progress: stable        Final diagnoses:   Chest pain, unspecified type   Hypoxia   Chronic obstructive pulmonary disease, unspecified COPD type (CMS/HCC)            Yvon Edmond PA  12/03/18 1485

## 2018-12-02 NOTE — H&P
[facility] Medicine Services  HISTORY AND PHYSICAL    Patient Name: Buck Kenney Jr.  : 1960  MRN: [id number]  Primary Care Physician: Cisco Harley MD  Date of Admission: 2018    Subjective     Chief Complaint:  Hypoxia    History of Present Illness: Buck Kenney Jr. is a 58 y.o. male with a 42 year ppd of smoking cigarettes, PVDZ, CAD, DM2 and Opioid Dependence.  He describes about a week of cough and congestion while admitted to [facility] for his opioid dependence treatment.  He describes sick contacts.  He reports a productive cough over 2 days not improving with self care.  He describes associated mucopurulent cough that is blood tinged.  He describes associated dyspnea that becomes worse with activity.  He denies retrosternal chest pain, racing heart beats or peripheral edema.  He presented to [facility] ED for evaluation.  His oxygen saturation on room air was identified at 83%.  He denies associated fever, chills, rashes or pain with inspiration.  He required oxygen to maintain appropriate saturations.    Review of Systems   Constitutional: Positive for activity change, appetite change and fatigue. Negative for chills and fever.   HENT: Positive for congestion and postnasal drip. Negative for sore throat and trouble swallowing.    Eyes: Negative.  Negative for discharge.   Respiratory: Positive for cough and shortness of breath.    Cardiovascular: Negative.  Negative for chest pain and palpitations.   Gastrointestinal: Negative.  Negative for abdominal pain.   Endocrine: Negative.  Negative for polydipsia, polyphagia and polyuria.   Genitourinary: Negative.  Negative for dysuria.   Musculoskeletal: Positive for neck pain.   Skin: Negative.  Negative for rash.   Allergic/Immunologic: Negative.    Neurological: Negative.    Hematological: Negative.    Psychiatric/Behavioral: Negative.         Past Medical History:   Diagnosis Date   • CAD (coronary artery  "disease)    • COPD (chronic obstructive pulmonary disease) (CMS/[facility])    • Deep vein thrombosis of lower extremity (CMS/[facility])    • DM2 (diabetes mellitus, type 2) (CMS/[facility])    • GERD (gastroesophageal reflux disease)    • Hypertension    • Opioid abuse (CMS/[facility])    • PVD (peripheral vascular disease) (CMS/[facility])        Past Surgical History:   Procedure Laterality Date   • ABOVE KNEE AMPUTATION Left    • CAROTID ENDARTERECTOMY     • CERVICAL SPINE SURGERY     • CORONARY ANGIOPLASTY WITH STENT PLACEMENT     • ILIAC ARTERY - FEMORAL ARTERY BYPASS GRAFT     • ILIAC VEIN ANGIOPLASTY / STENTING     • INGUINAL HERNIA REPAIR         Family History   Problem Relation Age of Onset   • Hypertension Mother    • Diabetes Mother    • Cirrhosis Mother    • Hypertension Father    • Heart attack Father    • COPD Father    • Coronary artery disease Father    • Diabetes Father        Social History     Socioeconomic History   • Marital status:      Spouse name: N/A   • Number of children: Not on file   • Years of education: H.S.   • Highest education level: High school graduate   Occupational History   • Occupation:      Employer: DISABLED   Tobacco Use   • Smoking status: Current Every Day Smoker     Packs/day: 1.00     Years: 42.00     Pack years: 42.00     Types: Cigarettes   • Smokeless tobacco: Never Used   Substance and Sexual Activity   • Alcohol use: No   • Drug use: Yes     Types: Heroin   • Sexual activity: No       Medications:  Available home medication information reviewed and reconciled.      Allergies:  No Known Allergies    Objective     Vital Signs: /71   Pulse 84   Temp 98.2 °F (36.8 °C)   Resp 16   Ht 104.1 cm (41\")   Wt 70.8 kg (156 lb)   SpO2 96%   BMI 65.25 kg/m²   Body mass index is 65.25 kg/m².    Physical Exam   Constitutional: He is oriented to person, place, and time. He appears well-developed and well-nourished. He is cooperative.   HENT:   Head: Normocephalic and " atraumatic.   Right Ear: Hearing and external ear normal.   Left Ear: Hearing and external ear normal.   Nose: Nose normal.   Mouth/Throat: Mucous membranes are dry.   Eyes: Conjunctivae and EOM are normal. Pupils are equal, round, and reactive to light. No scleral icterus.   Neck: Trachea normal and normal range of motion. Neck supple. No JVD present. Carotid bruit is not present. No thyromegaly present.   Cardiovascular: Normal rate, regular rhythm, normal heart sounds and intact distal pulses.   Pulmonary/Chest: Effort normal. He has wheezes.   Abdominal: Soft. Bowel sounds are normal. There is no hepatosplenomegaly. There is no tenderness.   Musculoskeletal: Normal range of motion.   Bilateral AKA   Lymphadenopathy:     He has no cervical adenopathy.   Neurological: He is alert and oriented to person, place, and time. He has normal strength and normal reflexes. No sensory deficit.   Skin: Skin is warm and dry.   Psychiatric: His speech is normal and behavior is normal. Judgment and thought content normal. His mood appears anxious. Cognition and memory are normal.   Nursing note and vitals reviewed.      Results Reviewed:   I have personally reviewed and interpreted available lab data dated 12/02/18 as below.  Lab Results (most recent)     Procedure Component Value Units Date/Time    Blood Gas, Arterial With Co-Ox [[id number]]  (Abnormal) Collected:  12/02/18 1457    Specimen:  Arterial Blood Updated:  12/02/18 1502     Crispin's Test N/A     pH, Arterial 7.381 pH units      pCO2, Arterial 47.0 mm Hg      Comment: 83 Value above reference range        pO2, Arterial 54.1 mm Hg      Comment: 84 Value below reference range        HCO3, Arterial 27.8 mmol/L      Base Excess, Arterial 1.9 mmol/L      Hemoglobin, Blood Gas 16.6 g/dL      Hematocrit, Blood Gas 51.0 %      Oxyhemoglobin 84.5 %      Comment: 84 Value below reference range        Methemoglobin 0.80 %      Carboxyhemoglobin 4.1 %      Comment: 83 Value above  reference range        Temperature 37.0 C      Barometric Pressure for Blood Gas -- mmHg      Comment: N/A        Modality Nasal Cannula     FIO2 28 %      Ventilator Mode       Comment: Meter: [id number]     :  [id number]        Note --     pH, Temp Corrected 7.381 pH Units      pCO2, Temperature Corrected 47.0 mm Hg      pO2, Temperature Corrected 54.1 mm Hg     Comprehensive Metabolic Panel [[id number]]  (Abnormal) Collected:  12/02/18 1013    Specimen:  Blood Updated:  12/02/18 1113     Glucose 219 mg/dL      BUN 11 mg/dL      Creatinine 1.05 mg/dL      Sodium 135 mmol/L      Potassium 4.4 mmol/L      Chloride 103 mmol/L      CO2 24.0 mmol/L      Calcium 9.0 mg/dL      Total Protein 7.6 g/dL      Albumin 4.07 g/dL      ALT (SGPT) 13 U/L      AST (SGOT) 18 U/L      Alkaline Phosphatase 102 U/L      Total Bilirubin 0.4 mg/dL      eGFR Non African Amer 73 mL/min/1.73      Globulin 3.5 gm/dL      A/G Ratio 1.2 g/dL      BUN/Creatinine Ratio 10.5     Anion Gap 8.0 mmol/L     Lipase [[id number]]  (Normal) Collected:  12/02/18 1013    Specimen:  Blood Updated:  12/02/18 1113     Lipase 30 U/L     BNP [[id number]]  (Normal) Collected:  12/02/18 1013    Specimen:  Blood Updated:  12/02/18 1055     BNP 2.0 pg/mL      Comment: Results may be falsely decreased if patient taking Biotin.       CBC Auto Differential [[id number]]  (Abnormal) Collected:  12/02/18 1013    Specimen:  Blood Updated:  12/02/18 1040     WBC 12.00 10*3/mm3      RBC 5.59 10*6/mm3      Hemoglobin 17.9 g/dL      Hematocrit 54.3 %      MCV 97.1 fL      MCH 32.0 pg      MCHC 33.0 g/dL      RDW 13.8 %      RDW-SD 49.1 fl      MPV 10.4 fL      Platelets 213 10*3/mm3      Neutrophil % 64.6 %      Lymphocyte % 28.7 %      Monocyte % 3.5 %      Eosinophil % 2.5 %      Basophil % 0.7 %      Immature Grans % 0.4 %      Neutrophils, Absolute 7.76 10*3/mm3      Lymphocytes, Absolute 3.44 10*3/mm3      Monocytes, Absolute 0.42 10*3/mm3      Eosinophils,  Absolute 0.30 10*3/mm3      Basophils, Absolute 0.08 10*3/mm3      Immature Grans, Absolute 0.05 10*3/mm3     POC Troponin, Rapid [[id number]]  (Normal) Collected:  12/02/18 1019    Specimen:  Blood Updated:  12/02/18 1035     Troponin I 0.00 ng/mL      Comment: Serial Number: [id number]:  [id number]           I have personally reviewed imaging reports available and dated 12/02/18 as below.  CT Angiogram Chest With Contrast  Narrative: EXAMINATION: CT ANGIOGRAM CHEST W/CONTRAST - 12/2/2018     INDICATION: Shortness of breath. Evaluate for pulmonary embolus.      TECHNIQUE: Multiple axial CT imaging is obtained of the chest following  the administration of intravenous contrast according to the CT angio  protocol.  2D reformatted images were submitted to further facilitate  diagnostic accuracy and treatment planning.     The radiation dose reduction device was turned on for each scan per the  ALARA (As Low as Reasonably Achievable) protocol.     COMPARISON: None.     FINDINGS: No filling defect seen in the pulmonary arteries to suggest  evidence of pulmonary embolism. Cardiac chambers within normal limits.  There is no pericardial effusion. No bulky hilar or axillary  lymphadenopathy. Atherosclerotic disease within the thoracic aorta. The  lung parenchyma is grossly clear at the lung apices. Mild bronchiectatic  changes identified centrally. There are some atelectatic changes seen  posteriorly with the lung fields. No pleural effusion or pneumothorax.  Degenerative changes seen within the spine. The visualized portions of  the upper abdomen are unremarkable in appearance.     Impression: No CT evidence of acute intrathoracic abnormality.  No PE.     DICTATED:   12/2/2018  EDITED/ls :   12/2/2018        I have personally reviewed ECG report dated 12/02/18.       Assessment / Plan     Assessment/Problem List:     COPD with acute exacerbation (CMS/HCC)    Mucopurulent chronic bronchitis (CMS/HCC)    Tobacco abuse     GERD (gastroesophageal reflux disease)    DM2 (diabetes mellitus, type 2) (CMS/[facility])    Opioid abuse (CMS/[facility])    PVD (peripheral vascular disease) (CMS/[facility])    COPD exacerbation (CMS/[facility])    Plan:  --Admit to Telemetry  --Continuous pulse oximetry  --Oxygen therapy to maintain saturations  --Duoneb therapy  --Prednisone therapy  --Incentive spirometry  --Pre/Post spirometry evaluation  --Tobacco cessation education  --Nicotine replacement  --Doxycycline therapy  --PPI therapy  --Continue Suboxone therapy  --Antiplatelet therapy  --SSI therapy    With severe exacerbation of COPD secondary to infectious etiology and need for supplemental oxygen & steroids we will admit as observation.    DVT prophylaxis: Lovenox   Code Status: Full  Admission Status: Patient will be admitted as [facility]EXOBS.     Electronically signed by Gómez Kelly MD, 12/02/18, 3:55 PM

## 2018-12-03 VITALS
HEART RATE: 81 BPM | HEIGHT: 60 IN | TEMPERATURE: 97.8 F | RESPIRATION RATE: 18 BRPM | WEIGHT: 159.7 LBS | SYSTOLIC BLOOD PRESSURE: 123 MMHG | OXYGEN SATURATION: 93 % | DIASTOLIC BLOOD PRESSURE: 75 MMHG | BODY MASS INDEX: 31.35 KG/M2

## 2018-12-03 LAB
ANION GAP SERPL CALCULATED.3IONS-SCNC: 5 MMOL/L (ref 3–11)
ARTICHOKE IGE QN: 183 MG/DL (ref 0–130)
BASOPHILS # BLD AUTO: 0.02 10*3/MM3 (ref 0–0.2)
BASOPHILS NFR BLD AUTO: 0.1 % (ref 0–1)
BUN BLD-MCNC: 13 MG/DL (ref 9–23)
BUN/CREAT SERPL: 14.4 (ref 7–25)
CALCIUM SPEC-SCNC: 9.3 MG/DL (ref 8.7–10.4)
CHLORIDE SERPL-SCNC: 104 MMOL/L (ref 99–109)
CHOLEST SERPL-MCNC: 214 MG/DL (ref 0–200)
CO2 SERPL-SCNC: 27 MMOL/L (ref 20–31)
CREAT BLD-MCNC: 0.9 MG/DL (ref 0.6–1.3)
DEPRECATED RDW RBC AUTO: 48.9 FL (ref 37–54)
EOSINOPHIL # BLD AUTO: 0 10*3/MM3 (ref 0–0.3)
EOSINOPHIL NFR BLD AUTO: 0 % (ref 0–3)
ERYTHROCYTE [DISTWIDTH] IN BLOOD BY AUTOMATED COUNT: 13.7 % (ref 11.3–14.5)
GFR SERPL CREATININE-BSD FRML MDRD: 87 ML/MIN/1.73
GLUCOSE BLD-MCNC: 255 MG/DL (ref 70–100)
GLUCOSE BLDC GLUCOMTR-MCNC: 224 MG/DL (ref 70–130)
GLUCOSE BLDC GLUCOMTR-MCNC: 268 MG/DL (ref 70–130)
HBA1C MFR BLD: 10.4 % (ref 4.8–5.6)
HCT VFR BLD AUTO: 51 % (ref 38.9–50.9)
HDLC SERPL-MCNC: 29 MG/DL (ref 40–60)
HGB BLD-MCNC: 16.7 G/DL (ref 13.1–17.5)
IMM GRANULOCYTES # BLD: 0.05 10*3/MM3 (ref 0–0.03)
IMM GRANULOCYTES NFR BLD: 0.3 % (ref 0–0.6)
LYMPHOCYTES # BLD AUTO: 1.8 10*3/MM3 (ref 0.6–4.8)
LYMPHOCYTES NFR BLD AUTO: 10 % (ref 24–44)
MCH RBC QN AUTO: 31.9 PG (ref 27–31)
MCHC RBC AUTO-ENTMCNC: 32.7 G/DL (ref 32–36)
MCV RBC AUTO: 97.3 FL (ref 80–99)
MONOCYTES # BLD AUTO: 0.33 10*3/MM3 (ref 0–1)
MONOCYTES NFR BLD AUTO: 1.8 % (ref 0–12)
NEUTROPHILS # BLD AUTO: 15.81 10*3/MM3 (ref 1.5–8.3)
NEUTROPHILS NFR BLD AUTO: 88.1 % (ref 41–71)
PLATELET # BLD AUTO: 219 10*3/MM3 (ref 150–450)
PMV BLD AUTO: 10.9 FL (ref 6–12)
POTASSIUM BLD-SCNC: 5.3 MMOL/L (ref 3.5–5.5)
RBC # BLD AUTO: 5.24 10*6/MM3 (ref 4.2–5.76)
SODIUM BLD-SCNC: 136 MMOL/L (ref 132–146)
TRIGL SERPL-MCNC: 158 MG/DL (ref 0–150)
TSH SERPL DL<=0.05 MIU/L-ACNC: 0.39 MIU/ML (ref 0.35–5.35)
WBC NRBC COR # BLD: 17.96 10*3/MM3 (ref 3.5–10.8)

## 2018-12-03 PROCEDURE — 80048 BASIC METABOLIC PNL TOTAL CA: CPT | Performed by: FAMILY MEDICINE

## 2018-12-03 PROCEDURE — 99217 PR OBSERVATION CARE DISCHARGE MANAGEMENT: CPT | Performed by: INTERNAL MEDICINE

## 2018-12-03 PROCEDURE — 94799 UNLISTED PULMONARY SVC/PX: CPT

## 2018-12-03 PROCEDURE — 85025 COMPLETE CBC W/AUTO DIFF WBC: CPT | Performed by: FAMILY MEDICINE

## 2018-12-03 PROCEDURE — 83036 HEMOGLOBIN GLYCOSYLATED A1C: CPT | Performed by: FAMILY MEDICINE

## 2018-12-03 PROCEDURE — 80061 LIPID PANEL: CPT | Performed by: FAMILY MEDICINE

## 2018-12-03 PROCEDURE — G0378 HOSPITAL OBSERVATION PER HR: HCPCS

## 2018-12-03 PROCEDURE — 63710000001 PREDNISONE PER 1 MG: Performed by: FAMILY MEDICINE

## 2018-12-03 PROCEDURE — 82962 GLUCOSE BLOOD TEST: CPT

## 2018-12-03 PROCEDURE — 84443 ASSAY THYROID STIM HORMONE: CPT | Performed by: FAMILY MEDICINE

## 2018-12-03 PROCEDURE — 94760 N-INVAS EAR/PLS OXIMETRY 1: CPT

## 2018-12-03 PROCEDURE — 94640 AIRWAY INHALATION TREATMENT: CPT

## 2018-12-03 RX ORDER — DOXYCYCLINE 100 MG/1
100 CAPSULE ORAL EVERY 12 HOURS
Qty: 8 CAPSULE | Refills: 0 | Status: SHIPPED | OUTPATIENT
Start: 2018-12-03 | End: 2018-12-07

## 2018-12-03 RX ORDER — PREDNISONE 20 MG/1
40 TABLET ORAL
Qty: 8 TABLET | Refills: 0 | Status: SHIPPED | OUTPATIENT
Start: 2018-12-04 | End: 2018-12-08

## 2018-12-03 RX ORDER — BUDESONIDE AND FORMOTEROL FUMARATE DIHYDRATE 80; 4.5 UG/1; UG/1
2 AEROSOL RESPIRATORY (INHALATION)
Qty: 1 INHALER | Refills: 0 | Status: SHIPPED | OUTPATIENT
Start: 2018-12-03

## 2018-12-03 RX ADMIN — CLOPIDOGREL BISULFATE 75 MG: 75 TABLET ORAL at 08:48

## 2018-12-03 RX ADMIN — BUDESONIDE 0.5 MG: 0.5 INHALANT RESPIRATORY (INHALATION) at 07:12

## 2018-12-03 RX ADMIN — ASPIRIN 81 MG 81 MG: 81 TABLET ORAL at 08:48

## 2018-12-03 RX ADMIN — DULOXETINE 60 MG: 60 CAPSULE, DELAYED RELEASE ORAL at 08:48

## 2018-12-03 RX ADMIN — BUPRENORPHINE HYDROCHLORIDE AND NALOXONE HYDROCHLORIDE DIHYDRATE 1 TABLET: 2; .5 TABLET SUBLINGUAL at 08:48

## 2018-12-03 RX ADMIN — IPRATROPIUM BROMIDE AND ALBUTEROL SULFATE 3 ML: 2.5; .5 SOLUTION RESPIRATORY (INHALATION) at 07:12

## 2018-12-03 RX ADMIN — INSULIN LISPRO 6 UNITS: 100 INJECTION, SOLUTION INTRAVENOUS; SUBCUTANEOUS at 11:36

## 2018-12-03 RX ADMIN — SODIUM CHLORIDE, PRESERVATIVE FREE 3 ML: 5 INJECTION INTRAVENOUS at 08:38

## 2018-12-03 RX ADMIN — TAMSULOSIN HYDROCHLORIDE 0.4 MG: 0.4 CAPSULE ORAL at 08:48

## 2018-12-03 RX ADMIN — HYDROCHLOROTHIAZIDE: 25 TABLET ORAL at 08:48

## 2018-12-03 RX ADMIN — GABAPENTIN 800 MG: 400 CAPSULE ORAL at 05:28

## 2018-12-03 RX ADMIN — PANTOPRAZOLE SODIUM 40 MG: 40 TABLET, DELAYED RELEASE ORAL at 05:28

## 2018-12-03 RX ADMIN — DOXYCYCLINE 100 MG: 100 CAPSULE ORAL at 05:28

## 2018-12-03 RX ADMIN — PREDNISONE 40 MG: 20 TABLET ORAL at 08:48

## 2018-12-03 RX ADMIN — INSULIN LISPRO 4 UNITS: 100 INJECTION, SOLUTION INTRAVENOUS; SUBCUTANEOUS at 08:34

## 2018-12-03 NOTE — PLAN OF CARE
Problem: Fall Risk (Adult)  Goal: Identify Related Risk Factors and Signs and Symptoms  Outcome: Ongoing (interventions implemented as appropriate)    Goal: Absence of Fall  Outcome: Ongoing (interventions implemented as appropriate)      Problem: ARDS (Acute Resp Distress Syndrome) (Adult)  Goal: Signs and Symptoms of Listed Potential Problems Will be Absent, Minimized or Managed (ARDS)  Outcome: Ongoing (interventions implemented as appropriate)

## 2018-12-03 NOTE — PROGRESS NOTES
Discharge Planning Assessment  [facility]     Patient Name: Buck Kenney Jr.  MRN: [id number]  Today's Date: 12/3/2018    Admit Date: 12/2/2018    Discharge Needs Assessment     Row Name 12/03/18 1057       Living Environment    Lives With  alone    Current Living Arrangements  home/apartment/condo    Primary Care Provided by  self    Provides Primary Care For  no one, unable/limited ability to care for self    Family Caregiver if Needed  parent(s)    Quality of Family Relationships  unable to assess    Able to Return to Prior Arrangements  yes       Resource/Environmental Concerns    Resource/Environmental Concerns  none    Transportation Concerns  car, none       Transition Planning    Patient/Family Anticipates Transition to  home    Patient/Family Anticipated Services at Transition  none    Transportation Anticipated  family or friend will provide       Discharge Needs Assessment    Readmission Within the Last 30 Days  no previous admission in last 30 days    Concerns to be Addressed  no discharge needs identified;denies needs/concerns at this time    Equipment Currently Used at Home  wheelchair    Anticipated Changes Related to Illness  none    Equipment Needed After Discharge  none    Current Discharge Risk  lives alone;physical impairment;chronically ill;substance use/abuse        Discharge Plan     Row Name 12/03/18 1938       Plan    Plan  Home    Patient/Family in Agreement with Plan  yes    Plan Comments  Met with patient at bedside to initiate discharge planning. He is planning to discharge home today. He lives alone in a home in [place]. He is independent with his ADLs; uses a wheelchair for ambulation due to bilateral AKAs. Does not have home health, but states he has a [employer]  that checks on him weekly at home. He was admitted to [facility] from [facility] (was there for opioid abuse treatment). States he has complete his treatment at [facility]. His friend Aldair will be picking  him up from the hospital today and will take him by [facility] to get his wheelchair and then will take him home. He states his PCP is Dr. Brown out of [place] now and this has been updated in EPIC. Joan Arguelles RN [phone]    Final Discharge Disposition Code  01 - home or self-care        Destination      No service coordination in this encounter.      Durable Medical Equipment      No service coordination in this encounter.      Dialysis/Infusion      No service coordination in this encounter.      Home Medical Care      No service coordination in this encounter.      Community Resources      No service coordination in this encounter.        Expected Discharge Date and Time     Expected Discharge Date Expected Discharge Time    Dec 3, 2018         Demographic Summary     Row Name 12/03/18 1056       General Information    Arrived From  psychiatric facility    Referral Source  admission list    Reason for Consult  discharge planning    Preferred Language  English        Functional Status     Row Name 12/03/18 1057       Functional Status    Usual Activity Tolerance  moderate    Current Activity Tolerance  moderate       Functional Status, IADL    Medications  independent    Meal Preparation  assistive person    Housekeeping  assistive person    Laundry  assistive person    Shopping  assistive person       Employment/    Employment/ Comments  [employer] Medicare Replacement with Rx medication coverage        Psychosocial    No documentation.       Abuse/Neglect    No documentation.       Legal    No documentation.       Substance Abuse    No documentation.       Patient Forms    No documentation.           Joan Arguelles RN

## 2018-12-03 NOTE — DISCHARGE SUMMARY
[facility] Medicine Services  DISCHARGE SUMMARY    Patient Name: Buck Kenney Jr.  : 1960  MRN: [id number]    Date of Admission: 2018  Date of Discharge:  12/3/2018  Primary Care Physician: Cisco Harley MD    Consults     No orders found for last 30 day(s).        Hospital Course     Presenting Problem:   COPD exacerbation (CMS/Formerly [facility]) [J44.1]    Active Hospital Problems    Diagnosis Date Noted   • **COPD with acute exacerbation (CMS/HCC) [J44.1] 2018   • GERD (gastroesophageal reflux disease) [K21.9] 2018   • DM2 (diabetes mellitus, type 2) (CMS/Formerly [facility]) [E11.9] 2018   • Opioid abuse (CMS/Formerly [facility]) [F11.10] 2018   • COPD exacerbation (CMS/Formerly [facility]) [J44.1] 2018   • Mucopurulent chronic bronchitis (CMS/Formerly [facility]) [J41.1] 2018   • Tobacco abuse [Z72.0] 2016   • PVD (peripheral vascular disease) (CMS/Formerly [facility]) [I73.9]       Resolved Hospital Problems    Diagnosis Date Noted Date Resolved   • Centrilobular emphysema (CMS/Formerly [facility]) [J43.2] 2018          Hospital Course:  Buck Kenney Jr. is a 58 y.o. male with PMH significant 58 y.o. male with a 42 year PPD of smoking cigarettes, PVD, CAD, DM2 and Opioid Dependence (recently completed outpatient tx at the [facility]) who presents with complaints of worsening SOB, cough and congestion. He presented to [facility] ED for evaluation.  His oxygen saturation on room air was identified at 83%.  He denied associated fever, chills, rashes or pain with inspiration.  He required oxygen to maintain appropriate saturations, thus was admitted overnight for observation. He was started on PO doxy and PO steroids. He was given duonebs and BID budesonide with much improvement overnight. He is feeling better today and states he is going home. O2 sats are room air are staying >90%. He will be discharged with PO doxy/PO predisone to complete 5 day course. He will also be provided with BID symbicort inhaler. He  should f/u with his PCP to discuss outpatient PFTs when not in exacerbation. He reports he has been referred to a suboxone clinic and will hopefully be seen today when he is discharged. No other acute issues, stable for transport home with family to transport.    Discharge Follow Up Recommendations for labs/diagnostics:  - follow up with PCP in one week, discuss pulmonology referral  - recommend outpatient PFTs    Day of Discharge     HPI:   Patient sitting up in bed. He wants to go home, says he is leaving. No chest pain. No SOB. Says he is feeling much better. Does not wear oxygen at home, and is staying in 90's on room air.    Review of Systems  Gen- No fevers, chills  CV- No chest pain, palpitations  Resp- No cough, dyspnea  GI- No N/V/D, abd pain    Otherwise ROS is negative except as mentioned in the HPI.    Vital Signs:   Temp:  [97.8 °F (36.6 °C)-99.3 °F (37.4 °C)] 97.8 °F (36.6 °C)  Heart Rate:  [] 102  Resp:  [16-20] 18  BP: (108-147)/(69-94) 123/75     Physical Exam:  Constitutional: No acute distress, awake, alert  HENT: NCAT, mucous membranes moist  Respiratory: Clear to auscultation bilaterally, respiratory effort normal   Cardiovascular: RRR, no murmurs, rubs, or gallops, palpable pedal pulses bilaterally  Gastrointestinal: Positive bowel sounds, soft, nontender, nondistended  Musculoskeletal: No bilateral ankle edema  Psychiatric: Appropriate affect, cooperative  Neurologic: Oriented x 3, strength symmetric in all extremities, Cranial Nerves grossly intact to confrontation, speech clear  Skin: No rashes      Pertinent  and/or Most Recent Results     Results from last 7 days   Lab Units  12/03/18   0441  12/02/18   1013   WBC 10*3/mm3  17.96*  12.00*   HEMOGLOBIN g/dL  16.7  17.9*   HEMATOCRIT %  51.0*  54.3*   PLATELETS 10*3/mm3  219  213   SODIUM mmol/L  136  135   POTASSIUM mmol/L  5.3  4.4   CHLORIDE mmol/L  104  103   CO2 mmol/L  27.0  24.0   BUN mg/dL  13  11   CREATININE mg/dL  0.90  1.05    GLUCOSE mg/dL  255*  219*   CALCIUM mg/dL  9.3  9.0     Results from last 7 days   Lab Units  12/02/18   1013   BILIRUBIN mg/dL  0.4   ALK PHOS U/L  102*   ALT (SGPT) U/L  13   AST (SGOT) U/L  18     Results from last 7 days   Lab Units  12/03/18   0441   CHOLESTEROL mg/dL  214*   TRIGLYCERIDES mg/dL  158*   HDL CHOL mg/dL  29*     Results from last 7 days   Lab Units  12/03/18   0441  12/02/18   1303  12/02/18   1013   TSH mIU/mL  0.387   --    --    HEMOGLOBIN A1C %  10.40*   --    --    BNP pg/mL   --    --   2.0   TROPONIN I ng/mL   --   <0.006   --      Brief Urine Lab Results     None          Microbiology Results Abnormal     None          Imaging Results (all)     Procedure Component Value Units Date/Time    XR Chest 1 View [[id number]] Collected:  12/02/18 1650     Updated:  12/02/18 1650    Narrative:          EXAMINATION: XR CHEST 1 VW - 12/2/2018     INDICATION: Chest pain.     COMPARISON: 11/2/2016     FINDINGS: Portable chest reveals cardiac and mediastinal silhouettes  within normal limits. The lung fields are grossly clear. No focal  parenchymal opacification is present. No pleural effusion or  pneumothorax. Degenerative change is seen within the spine. The  pulmonary vascularity is within normal limits.          Impression:       No acute cardiopulmonary disease.     DICTATED:   12/2/2018  EDITED/ls :   12/2/2018           CT Angiogram Chest With Contrast [[id number]] Collected:  12/02/18 1517     Updated:  12/02/18 1517    Narrative:       EXAMINATION: CT ANGIOGRAM CHEST W/CONTRAST - 12/2/2018     INDICATION: Shortness of breath. Evaluate for pulmonary embolus.      TECHNIQUE: Multiple axial CT imaging is obtained of the chest following  the administration of intravenous contrast according to the CT angio  protocol.  2D reformatted images were submitted to further facilitate  diagnostic accuracy and treatment planning.     The radiation dose reduction device was turned on for each scan per  the  ALARA (As Low as Reasonably Achievable) protocol.     COMPARISON: None.     FINDINGS: No filling defect seen in the pulmonary arteries to suggest  evidence of pulmonary embolism. Cardiac chambers within normal limits.  There is no pericardial effusion. No bulky hilar or axillary  lymphadenopathy. Atherosclerotic disease within the thoracic aorta. The  lung parenchyma is grossly clear at the lung apices. Mild bronchiectatic  changes identified centrally. There are some atelectatic changes seen  posteriorly with the lung fields. No pleural effusion or pneumothorax.  Degenerative changes seen within the spine. The visualized portions of  the upper abdomen are unremarkable in appearance.       Impression:       No CT evidence of acute intrathoracic abnormality.  No PE.     DICTATED:   12/2/2018  EDITED/ls :   12/2/2018              Results for orders placed in visit on 07/25/16   SCANNED VASCULAR STUDIES       Results for orders placed in visit on 07/25/16   SCANNED VASCULAR STUDIES              Discharge Details        Discharge Medications      New Medications      Instructions Start Date   budesonide-formoterol 80-4.5 MCG/ACT inhaler  Commonly known as:  SYMBICORT   2 puffs, Inhalation, 2 Times Daily - RT      doxycycline 100 MG capsule  Commonly known as:  MONODOX   100 mg, Oral, Every 12 Hours      predniSONE 20 MG tablet  Commonly known as:  DELTASONE   40 mg, Oral, Daily With Breakfast         Continue These Medications      Instructions Start Date   acetaminophen 325 MG tablet  Commonly known as:  TYLENOL   650 mg, Oral, Every 6 Hours PRN      aluminum-magnesium hydroxide-simethicone 200-200-20 MG/5ML suspension  Commonly known as:  MAALOX/MYLANTA   30 mL, Oral, Every 6 Hours PRN      aspirin 81 MG tablet   81 mg, Oral, Daily      atorvastatin 20 MG tablet  Commonly known as:  LIPITOR   20 mg, Oral, Daily      bisacodyl 5 MG EC tablet  Commonly known as:  DULCOLAX   5 mg, Oral, Daily PRN       buprenorphine-naloxone 2-0.5 MG per SL tablet  Commonly known as:  SUBOXONE   1 tablet, Sublingual, 2 Times Daily      citalopram 20 MG tablet  Commonly known as:  CeleXA   30 mg, Oral, Daily      CloNIDine 0.1 MG tablet  Commonly known as:  CATAPRES   0.1 mg, Oral, Every 6 Hours PRN      cyclobenzaprine 10 MG tablet  Commonly known as:  FLEXERIL   10 mg, Oral, 3 Times Daily PRN      dicyclomine 10 MG capsule  Commonly known as:  BENTYL   10 mg, Oral, 4 Times Daily Before Meals & Nightly      docusate sodium 100 MG capsule  Commonly known as:  COLACE   100 mg, Oral, 2 Times Daily      gabapentin 800 MG tablet  Commonly known as:  NEURONTIN   800 mg, Oral, 3 Times Daily      hydrOXYzine 50 MG tablet  Commonly known as:  ATARAX   50 mg, Oral, 3 Times Daily PRN      insulin aspart 100 UNIT/ML injection  Commonly known as:  novoLOG   Subcutaneous, 3 Times Daily Before Meals, ssi       lansoprazole 30 MG capsule  Commonly known as:  PREVACID   30 mg, Oral, Daily      loperamide 2 MG capsule  Commonly known as:  IMODIUM   2 mg, Oral, 4 Times Daily PRN      magnesium hydroxide 400 MG/5ML suspension  Commonly known as:  MILK OF MAGNESIA   Oral, Every 4 Hours PRN      metFORMIN 1000 MG tablet  Commonly known as:  GLUCOPHAGE   1,000 mg, Oral, 2 Times Daily With Meals      nystatin 810707 UNIT/ML suspension  Commonly known as:  MYCOSTATIN   500,000 Units, Swish & Swallow, 3 Times Daily      ondansetron 4 MG tablet  Commonly known as:  ZOFRAN   4 mg, Oral, Every 8 Hours PRN      promethazine 25 MG tablet  Commonly known as:  PHENERGAN   25 mg, Oral, Every 6 Hours PRN      PHENERGAN 25 MG suppository  Generic drug:  promethazine   25 mg, Rectal, Every 6 Hours PRN      traZODone 50 MG tablet  Commonly known as:  DESYREL   50 mg, Oral, Nightly PRN               Discharge Disposition:  Home or Self Care    Discharge Diet:     Dietary Orders (From admission, onward)    Start     Ordered    12/02/18 9532  Diet Regular; Cardiac,  Consistent Carbohydrate  Diet Effective Now     Question Answer Comment   Diet Texture / Consistency Regular    Common Modifiers Cardiac    Common Modifiers Consistent Carbohydrate        12/02/18 1711            Discharge Activity:   - as tolerated        Special Instructions:      Code Status/Level of Support:  Code Status and Medical Interventions:   Ordered at: 12/02/18 1540     Level Of Support Discussed With:    Patient     Code Status:    CPR     Medical Interventions (Level of Support Prior to Arrest):    Full       No future appointments.        Time Spent on Discharge:  35 minutes    Electronically signed by Veena Broderick DO, 12/03/18, 10:43 AM.

## 2018-12-04 ENCOUNTER — READMISSION MANAGEMENT (OUTPATIENT)
Dept: CALL CENTER | Facility: HOSPITAL | Age: 58
End: 2018-12-04

## 2018-12-04 ENCOUNTER — HOSPITAL ENCOUNTER (OUTPATIENT)
Age: 58
End: 2018-12-04
Payer: MEDICARE

## 2018-12-04 DIAGNOSIS — R73.9: Primary | ICD-10-CM

## 2018-12-04 LAB — HBA1C MFR BLD: 8.6 % (ref 0–7)

## 2018-12-04 PROCEDURE — 83036 HEMOGLOBIN GLYCOSYLATED A1C: CPT

## 2018-12-04 NOTE — OUTREACH NOTE
Prep Survey      Responses   Facility patient discharged from?  [facility]   Is patient eligible?  Yes   Discharge diagnosis  COPD with acute exacerbation    Does the patient have one of the following disease processes/diagnoses(primary or secondary)?  COPD/Pneumonia   Does the patient have Home health ordered?  No   Is there a DME ordered?  No   General alerts for this patient  bilateral AKA's   Prep survey completed?  Yes          Zulma Gallegos RN

## 2018-12-05 ENCOUNTER — READMISSION MANAGEMENT (OUTPATIENT)
Dept: CALL CENTER | Facility: HOSPITAL | Age: 58
End: 2018-12-05

## 2018-12-05 LAB
ARTERIAL PATENCY WRIST A: ABNORMAL
ATMOSPHERIC PRESS: ABNORMAL MMHG
BASE EXCESS BLDA CALC-SCNC: 1.9 MMOL/L (ref 0–2)
BODY TEMPERATURE: 37 C
CO2 BLDA-SCNC: 29.3 MMOL/L (ref 23–27)
COHGB MFR BLD: 4.1 % (ref 0–2)
HCO3 BLDA-SCNC: 27.8 MMOL/L (ref 20–26)
HCT VFR BLD CALC: 51 %
HGB BLDA-MCNC: 16.6 G/DL (ref 13.5–17.5)
HOROWITZ INDEX BLD+IHG-RTO: 28 %
METHGB BLD QL: 0.8 % (ref 0–1.5)
MODALITY: ABNORMAL
NOTE: ABNORMAL
OXYHGB MFR BLDV: 84.5 % (ref 94–99)
PCO2 BLDA: 47 MM HG
PCO2 TEMP ADJ BLD: 47 MM HG (ref 35–48)
PH BLDA: 7.38 PH UNITS (ref 7.35–7.45)
PH, TEMP CORRECTED: 7.38 PH UNITS
PO2 BLDA: 54.1 MM HG (ref 83–108)
PO2 TEMP ADJ BLD: 54.1 MM HG (ref 83–108)
VENTILATOR MODE: ABNORMAL

## 2018-12-05 NOTE — OUTREACH NOTE
COPD/PN Week 1 Survey      Responses   Facility patient discharged from?  [facility]   Does the patient have one of the following disease processes/diagnoses(primary or secondary)?  COPD/Pneumonia   Is there a successful TCM telephone encounter documented?  No   Was the primary reason for admission:  COPD exacerbation   Week 1 attempt successful?  No   Unsuccessful attempts  Attempt 1          Mihaela Nguyen, RN

## 2018-12-06 ENCOUNTER — READMISSION MANAGEMENT (OUTPATIENT)
Dept: CALL CENTER | Facility: HOSPITAL | Age: 58
End: 2018-12-06

## 2018-12-06 NOTE — OUTREACH NOTE
COPD/PN Week 1 Survey      Responses   Facility patient discharged from?  [place]   Does the patient have one of the following disease processes/diagnoses(primary or secondary)?  COPD/Pneumonia   Is there a successful TCM telephone encounter documented?  No   Was the primary reason for admission:  COPD exacerbation   Week 1 attempt successful?  Yes   Call start time  1603   Call end time  1613   Discharge diagnosis  COPD with acute exacerbation    Meds reviewed with patient/caregiver?  Yes   Is the patient having any side effects they believe may be caused by any medication additions or changes?  No   Does the patient have all medications ordered at discharge?  Yes   Is the patient taking all medications as directed (includes completed medication regime)?  Yes   Does the patient have a primary care provider?   Yes   Does the patient have an appointment with their PCP or pulmonologist within 7 days of discharge?  Yes   Has the patient kept scheduled appointments due by today?  Yes   Has home health visited the patient within 72 hours of discharge?  Yes   Psychosocial issues?  No   Did the patient receive a copy of their discharge instructions?  Yes   Nursing interventions  Reviewed instructions with patient, Educated on MyChart   What is the patient's perception of their health status since discharge?  Improving   If the patient is a current smoker, are they able to teach back resources for cessation?  [id number], Smoking cessation medications, Smoking cessation classes, Smoking cessation support groups   Is the patient/caregiver able to teach back the hierarchy of who to call/visit for symptoms/problems? PCP, Specialist, Home health nurse, Urgent Care, ED, 911  Yes   Is the patient able to teach back COPD zones?  No   Nursing interventions  Education provided on various zones   Patient reports what zone on this call?  Green Zone   Green Zone  Reports doing well, Breathing without shortness of breath, Usual  activity and exercise level, Usual amount of phlegm/mucus without difficulty coughing up, Sleeping well, Appetite is good   Green Zone interventions:  Take daily medications, Continue regular exercise/diet plan, Do not smoke, Avoid indoor/outdoor triggers, Use oxygen as prescribed   Week 1 call completed?  Yes          Mihaela Nguyen, RN

## 2018-12-13 ENCOUNTER — READMISSION MANAGEMENT (OUTPATIENT)
Dept: CALL CENTER | Facility: HOSPITAL | Age: 58
End: 2018-12-13

## 2018-12-13 NOTE — OUTREACH NOTE
COPD/PN Week 2 Survey      Responses   Facility patient discharged from?  [facility]   Does the patient have one of the following disease processes/diagnoses(primary or secondary)?  COPD/Pneumonia   Was the primary reason for admission:  COPD exacerbation   Week 2 attempt successful?  Yes   Call start time  1617   Call end time  1619   Discharge diagnosis  COPD with acute exacerbation    Meds reviewed with patient/caregiver?  Yes   Is the patient having any side effects they believe may be caused by any medication additions or changes?  No   Does the patient have all medications ordered at discharge?  Yes   Is the patient taking all medications as directed (includes completed medication regime)?  Yes   Does the patient have a primary care provider?   Yes   Does the patient have an appointment with their PCP or pulmonologist within 7 days of discharge?  Yes   Has the patient kept scheduled appointments due by today?  Yes   Has home health visited the patient within 72 hours of discharge?  Yes   Psychosocial issues?  No   Did the patient receive a copy of their discharge instructions?  Yes   Nursing interventions  Reviewed instructions with patient   What is the patient's perception of their health status since discharge?  Improving   Nursing Interventions  Nurse provided patient education   Is the patient/caregiver able to teach back the hierarchy of who to call/visit for symptoms/problems? PCP, Specialist, Home health nurse, Urgent Care, ED, 911  Yes   Is the patient able to teach back COPD zones?  Yes   Nursing interventions  Education provided on various zones   Patient reports what zone on this call?  Green Zone   Green Zone  Reports doing well, Breathing without shortness of breath, Usual activity and exercise level, Usual amount of phlegm/mucus without difficulty coughing up, Sleeping well, Appetite is good   Green Zone interventions:  Take daily medications, Use oxygen as prescribed, Continue regular  exercise/diet plan   Week 2 call completed?  Yes          Marina Mills RN

## 2019-02-05 ENCOUNTER — HOSPITAL ENCOUNTER (OUTPATIENT)
Age: 59
End: 2019-02-05
Payer: MEDICARE

## 2019-02-05 DIAGNOSIS — R79.89: ICD-10-CM

## 2019-02-05 DIAGNOSIS — E11.9: Primary | ICD-10-CM

## 2019-02-05 PROCEDURE — 84443 ASSAY THYROID STIM HORMONE: CPT

## 2019-02-05 PROCEDURE — 80061 LIPID PANEL: CPT

## 2019-02-05 PROCEDURE — 82652 VIT D 1 25-DIHYDROXY: CPT

## 2019-02-05 PROCEDURE — 82607 VITAMIN B-12: CPT

## 2019-02-05 PROCEDURE — 80053 COMPREHEN METABOLIC PANEL: CPT

## 2019-02-05 PROCEDURE — 85025 COMPLETE CBC W/AUTO DIFF WBC: CPT

## 2019-02-05 PROCEDURE — 84439 ASSAY OF FREE THYROXINE: CPT

## 2019-02-05 PROCEDURE — 83036 HEMOGLOBIN GLYCOSYLATED A1C: CPT

## 2019-02-05 PROCEDURE — 82746 ASSAY OF FOLIC ACID SERUM: CPT

## 2019-02-06 LAB
ALBUMIN LEVEL: 3.4 GM/DL (ref 3.4–5)
ALBUMIN/GLOB SERPL: 0.8 {RATIO} (ref 1.1–1.8)
ALP ISO SERPL-ACNC: 138 U/L (ref 46–116)
ALT SERPLBLD-CCNC: 19 U/L (ref 12–78)
ANION GAP SERPL CALC-SCNC: 11.7 MEQ/L (ref 5–15)
AST SERPL QL: 11 U/L (ref 15–37)
BILIRUBIN,TOTAL: 0.4 MG/DL (ref 0.2–1)
BUN SERPL-MCNC: 5 MG/DL (ref 7–18)
CALCIUM SPEC-MCNC: 9.7 MG/DL (ref 8.5–10.1)
CHLORIDE SPEC-SCNC: 100 MMOL/L (ref 98–107)
CHOLEST SPEC-SCNC: 289 MG/DL (ref 140–200)
CO2 SERPL-SCNC: 33 MMOL/L (ref 21–32)
CREAT BLD-SCNC: 0.91 MG/DL (ref 0.7–1.3)
ESTIMATED GLOMERULAR FILT RATE: 86 ML/MIN (ref 60–?)
GFR (AFRICAN AMERICAN): 104 ML/MIN (ref 60–?)
GLOBULIN SER CALC-MCNC: 4.4 GM/DL (ref 1.3–3.2)
GLUCOSE: 248 MG/DL (ref 74–106)
HBA1C MFR BLD: 8.7 % (ref 0–7)
HCT VFR BLD CALC: 56.9 % (ref 42–52)
HDLC SERPL-MCNC: 24 MG/DL (ref 27–67)
HGB BLD-MCNC: 17.5 G/DL (ref 14.1–18)
MCHC RBC-ENTMCNC: 30.7 G/DL (ref 31.8–35.4)
MCV RBC: 100.8 FL (ref 80–94)
MEAN CORPUSCULAR HEMOGLOBIN: 30.9 PG (ref 27–31.2)
PLATELET # BLD: 236 K/MM3 (ref 142–424)
POTASSIUM: 4.7 MMOL/L (ref 3.5–5.1)
PROT SERPL-MCNC: 7.8 GM/DL (ref 6.4–8.2)
RBC # BLD AUTO: 5.64 M/MM3 (ref 4.6–6.2)
SODIUM SPEC-SCNC: 140 MMOL/L (ref 136–145)
T4 FREE SERPL-MCNC: 1.06 NG/DL (ref 0.76–1.46)
TRIGLYCERIDES: 280 MG/DL (ref 30–200)
TSH SERPL-ACNC: 1.19 UIU/ML (ref 0.36–3.74)
WBC # BLD AUTO: 8.8 K/MM3 (ref 4.8–10.8)

## 2019-02-08 LAB — FOLATE: 9.9 NG/ML (ref 3–?)

## 2019-02-11 LAB — VITAMIN B12: 522 PG/ML (ref 232–1245)

## 2019-06-04 ENCOUNTER — HOSPITAL ENCOUNTER (OUTPATIENT)
Age: 59
End: 2019-06-04
Payer: MEDICARE

## 2019-06-04 DIAGNOSIS — E11.9: Primary | ICD-10-CM

## 2019-06-04 DIAGNOSIS — Z79.84: ICD-10-CM

## 2019-06-04 LAB
ANION GAP SERPL CALC-SCNC: 13.9 MEQ/L (ref 5–15)
BUN SERPL-MCNC: 7 MG/DL (ref 7–18)
CALCIUM SPEC-MCNC: 9 MG/DL (ref 8.5–10.1)
CHLORIDE SPEC-SCNC: 98 MMOL/L (ref 98–107)
CO2 SERPL-SCNC: 30 MMOL/L (ref 21–32)
CREAT BLD-SCNC: 0.84 MG/DL (ref 0.7–1.3)
ESTIMATED GLOMERULAR FILT RATE: 94 ML/MIN (ref 60–?)
GFR (AFRICAN AMERICAN): 114 ML/MIN (ref 60–?)
GLUCOSE: 248 MG/DL (ref 74–106)
HBA1C MFR BLD: 9 % (ref 0–7)
POTASSIUM: 3.9 MMOL/L (ref 3.5–5.1)
SODIUM SPEC-SCNC: 138 MMOL/L (ref 136–145)

## 2019-06-04 PROCEDURE — 80048 BASIC METABOLIC PNL TOTAL CA: CPT

## 2019-06-04 PROCEDURE — 83036 HEMOGLOBIN GLYCOSYLATED A1C: CPT

## 2019-09-06 ENCOUNTER — HOSPITAL ENCOUNTER (OUTPATIENT)
Age: 59
End: 2019-09-06
Payer: MEDICARE

## 2019-09-06 DIAGNOSIS — R09.89: Primary | ICD-10-CM

## 2019-09-06 PROCEDURE — 93880 EXTRACRANIAL BILAT STUDY: CPT

## 2019-09-20 ENCOUNTER — HOSPITAL ENCOUNTER (OUTPATIENT)
Age: 59
End: 2019-09-20
Payer: MEDICARE

## 2019-09-20 DIAGNOSIS — S16.1XXA: ICD-10-CM

## 2019-09-20 DIAGNOSIS — S09.90XA: Primary | ICD-10-CM

## 2019-09-20 PROCEDURE — 76376 3D RENDER W/INTRP POSTPROCES: CPT

## 2019-09-20 PROCEDURE — 70551 MRI BRAIN STEM W/O DYE: CPT

## 2019-09-20 PROCEDURE — 72141 MRI NECK SPINE W/O DYE: CPT

## 2019-10-02 ENCOUNTER — HOSPITAL ENCOUNTER (OUTPATIENT)
Age: 59
End: 2019-10-02
Payer: MEDICARE

## 2019-10-02 DIAGNOSIS — J44.9: ICD-10-CM

## 2019-10-02 DIAGNOSIS — I25.118: ICD-10-CM

## 2019-10-02 DIAGNOSIS — R07.89: ICD-10-CM

## 2019-10-02 DIAGNOSIS — Z89.511: ICD-10-CM

## 2019-10-02 DIAGNOSIS — E11.69: Primary | ICD-10-CM

## 2019-10-02 DIAGNOSIS — I65.23: ICD-10-CM

## 2019-10-02 DIAGNOSIS — E78.5: ICD-10-CM

## 2019-10-02 DIAGNOSIS — F17.200: ICD-10-CM

## 2019-10-02 DIAGNOSIS — R06.09: ICD-10-CM

## 2019-10-02 DIAGNOSIS — Z79.84: ICD-10-CM

## 2019-10-02 DIAGNOSIS — Z89.512: ICD-10-CM

## 2019-10-02 LAB
BUN SERPL-MCNC: 6 MG/DL (ref 7–18)
CREAT BLD-SCNC: 0.97 MG/DL (ref 0.7–1.3)
ESTIMATED GLOMERULAR FILT RATE: 79 ML/MIN (ref 60–?)
GFR (AFRICAN AMERICAN): 96 ML/MIN (ref 60–?)

## 2019-10-02 PROCEDURE — 36415 COLL VENOUS BLD VENIPUNCTURE: CPT

## 2019-10-02 PROCEDURE — 84520 ASSAY OF UREA NITROGEN: CPT

## 2019-10-02 PROCEDURE — 82565 ASSAY OF CREATININE: CPT

## 2019-10-02 PROCEDURE — 70498 CT ANGIOGRAPHY NECK: CPT

## 2019-10-02 PROCEDURE — 93306 TTE W/DOPPLER COMPLETE: CPT

## 2019-10-15 ENCOUNTER — HOSPITAL ENCOUNTER (OUTPATIENT)
Age: 59
End: 2019-10-15
Payer: MEDICARE

## 2019-10-15 DIAGNOSIS — G89.29: ICD-10-CM

## 2019-10-15 DIAGNOSIS — M54.2: ICD-10-CM

## 2019-10-15 DIAGNOSIS — Z79.84: ICD-10-CM

## 2019-10-15 DIAGNOSIS — E11.9: Primary | ICD-10-CM

## 2019-10-15 DIAGNOSIS — M54.9: ICD-10-CM

## 2019-10-15 PROCEDURE — 80305 DRUG TEST PRSMV DIR OPT OBS: CPT

## 2019-10-15 PROCEDURE — 82043 UR ALBUMIN QUANTITATIVE: CPT

## 2019-10-23 ENCOUNTER — HOSPITAL ENCOUNTER (OUTPATIENT)
Age: 59
End: 2019-10-23
Payer: MEDICARE

## 2019-10-23 DIAGNOSIS — I65.29: ICD-10-CM

## 2019-10-23 DIAGNOSIS — E78.5: ICD-10-CM

## 2019-10-23 DIAGNOSIS — R42: ICD-10-CM

## 2019-10-23 DIAGNOSIS — Z79.84: ICD-10-CM

## 2019-10-23 DIAGNOSIS — E11.69: ICD-10-CM

## 2019-10-23 DIAGNOSIS — H53.8: ICD-10-CM

## 2019-10-23 DIAGNOSIS — F17.200: ICD-10-CM

## 2019-10-23 DIAGNOSIS — I20.8: ICD-10-CM

## 2019-10-23 PROCEDURE — 78452 HT MUSCLE IMAGE SPECT MULT: CPT

## 2019-10-23 PROCEDURE — 93017 CV STRESS TEST TRACING ONLY: CPT

## 2019-10-23 PROCEDURE — A9502 TC99M TETROFOSMIN: HCPCS

## 2019-11-05 ENCOUNTER — HOSPITAL ENCOUNTER (OUTPATIENT)
Age: 59
End: 2019-11-05
Payer: MEDICARE

## 2019-11-05 DIAGNOSIS — G89.29: Primary | ICD-10-CM

## 2019-11-05 DIAGNOSIS — M54.2: ICD-10-CM

## 2019-11-05 DIAGNOSIS — M54.9: ICD-10-CM

## 2019-11-05 PROCEDURE — 80305 DRUG TEST PRSMV DIR OPT OBS: CPT

## 2019-12-03 ENCOUNTER — HOSPITAL ENCOUNTER (OUTPATIENT)
Age: 59
End: 2019-12-03
Payer: MEDICARE

## 2019-12-03 DIAGNOSIS — M54.9: ICD-10-CM

## 2019-12-03 DIAGNOSIS — Z79.899: ICD-10-CM

## 2019-12-03 DIAGNOSIS — G89.29: Primary | ICD-10-CM

## 2019-12-03 DIAGNOSIS — M54.2: ICD-10-CM

## 2019-12-03 PROCEDURE — 80365 DRUG SCREENING OXYCODONE: CPT

## 2019-12-03 PROCEDURE — 80305 DRUG TEST PRSMV DIR OPT OBS: CPT

## 2019-12-10 LAB — OXYCODONE CONFIRM: >3000 NG/ML

## 2020-01-02 ENCOUNTER — HOSPITAL ENCOUNTER (OUTPATIENT)
Dept: HOSPITAL 22 - PT | Age: 60
Discharge: HOME | End: 2020-01-02
Payer: MEDICARE

## 2020-01-02 DIAGNOSIS — M54.12: Primary | ICD-10-CM

## 2020-01-02 DIAGNOSIS — M48.02: ICD-10-CM

## 2020-01-02 DIAGNOSIS — M50.30: ICD-10-CM

## 2020-01-02 PROCEDURE — 97163 PT EVAL HIGH COMPLEX 45 MIN: CPT

## 2020-01-02 PROCEDURE — 97110 THERAPEUTIC EXERCISES: CPT

## 2020-01-09 ENCOUNTER — HOSPITAL ENCOUNTER (OUTPATIENT)
Age: 60
End: 2020-01-09
Payer: MEDICARE

## 2020-01-09 DIAGNOSIS — M47.817: Primary | ICD-10-CM

## 2020-01-09 PROCEDURE — 72114 X-RAY EXAM L-S SPINE BENDING: CPT

## 2020-02-24 ENCOUNTER — HOSPITAL ENCOUNTER (OUTPATIENT)
Age: 60
End: 2020-02-24
Payer: MEDICARE

## 2020-02-24 DIAGNOSIS — E03.9: ICD-10-CM

## 2020-02-24 DIAGNOSIS — K59.00: ICD-10-CM

## 2020-02-24 DIAGNOSIS — E11.69: ICD-10-CM

## 2020-02-24 DIAGNOSIS — E78.5: ICD-10-CM

## 2020-02-24 DIAGNOSIS — Z79.84: ICD-10-CM

## 2020-02-24 PROCEDURE — 80053 COMPREHEN METABOLIC PANEL: CPT

## 2020-02-24 PROCEDURE — 84443 ASSAY THYROID STIM HORMONE: CPT

## 2020-02-24 PROCEDURE — 80061 LIPID PANEL: CPT

## 2020-02-24 PROCEDURE — 85025 COMPLETE CBC W/AUTO DIFF WBC: CPT

## 2020-02-24 PROCEDURE — 84439 ASSAY OF FREE THYROXINE: CPT

## 2020-02-24 PROCEDURE — 83036 HEMOGLOBIN GLYCOSYLATED A1C: CPT

## 2020-02-24 PROCEDURE — 82570 ASSAY OF URINE CREATININE: CPT

## 2020-02-24 PROCEDURE — 82043 UR ALBUMIN QUANTITATIVE: CPT

## 2020-02-25 LAB
ALBUMIN LEVEL: 4.1 G/DL (ref 3.5–5)
ALBUMIN/GLOB SERPL: 1 {RATIO} (ref 1.1–1.8)
ALP ISO SERPL-ACNC: 118 U/L (ref 38–126)
ALT SERPLBLD-CCNC: 13 U/L (ref 12–78)
ANION GAP SERPL CALC-SCNC: 11.1 MEQ/L (ref 5–15)
AST SERPL QL: 21 U/L (ref 17–59)
BILIRUBIN,TOTAL: 0.5 MG/DL (ref 0.2–1.3)
BUN SERPL-MCNC: 7 MG/DL (ref 9–20)
CALCIUM SPEC-MCNC: 10.1 MG/DL (ref 8.4–10.2)
CHLORIDE SPEC-SCNC: 96 MMOL/L (ref 98–107)
CHOLEST SPEC-SCNC: 315 MG/DL (ref 140–200)
CO2 SERPL-SCNC: 34 MMOL/L (ref 22–30)
CREAT BLD-SCNC: 0.7 MG/DL (ref 0.66–1.25)
ESTIMATED GLOMERULAR FILT RATE: 115 ML/MIN (ref 60–?)
GFR (AFRICAN AMERICAN): 140 ML/MIN (ref 60–?)
GLOBULIN SER CALC-MCNC: 4.1 G/DL (ref 1.3–3.2)
GLUCOSE: 302 MG/DL (ref 74–100)
HBA1C MFR BLD: 10.1 % (ref 4–6)
HCT VFR BLD CALC: 56.8 % (ref 42–52)
HDLC SERPL-MCNC: 26 MG/DL (ref 40–60)
HGB BLD-MCNC: 17.8 G/DL (ref 14.1–18)
MCHC RBC-ENTMCNC: 31.4 G/DL (ref 31.8–35.4)
MCV RBC: 103.1 FL (ref 80–94)
MEAN CORPUSCULAR HEMOGLOBIN: 32.4 PG (ref 27–31.2)
PLATELET # BLD: 249 K/MM3 (ref 142–424)
POTASSIUM: 4.1 MMOL/L (ref 3.5–5.1)
PROT SERPL-MCNC: 8.2 G/DL (ref 6.3–8.2)
RBC # BLD AUTO: 5.51 M/MM3 (ref 4.6–6.2)
SODIUM SPEC-SCNC: 137 MMOL/L (ref 136–145)
T4 FREE SERPL-MCNC: 1.14 NG/DL (ref 0.78–2.19)
TRIGLYCERIDES: 298 MG/DL (ref 30–150)
TSH SERPL-ACNC: 1.47 UIU/ML (ref 0.47–4.68)
WBC # BLD AUTO: 10.4 K/MM3 (ref 4.8–10.8)

## 2020-05-20 ENCOUNTER — HOSPITAL ENCOUNTER (OUTPATIENT)
Age: 60
End: 2020-05-20
Payer: MEDICARE

## 2020-05-20 DIAGNOSIS — I25.10: ICD-10-CM

## 2020-05-20 DIAGNOSIS — F17.200: ICD-10-CM

## 2020-05-20 DIAGNOSIS — E78.5: ICD-10-CM

## 2020-05-20 DIAGNOSIS — E11.69: Primary | ICD-10-CM

## 2020-05-20 DIAGNOSIS — Z79.4: ICD-10-CM

## 2020-05-20 DIAGNOSIS — I65.23: ICD-10-CM

## 2020-05-20 DIAGNOSIS — I10: ICD-10-CM

## 2020-05-20 PROCEDURE — 93880 EXTRACRANIAL BILAT STUDY: CPT

## 2020-06-10 ENCOUNTER — HOSPITAL ENCOUNTER (EMERGENCY)
Age: 60
Discharge: HOME | End: 2020-06-10
Payer: MEDICARE

## 2020-06-10 VITALS
OXYGEN SATURATION: 96 % | DIASTOLIC BLOOD PRESSURE: 76 MMHG | TEMPERATURE: 98.7 F | HEART RATE: 78 BPM | SYSTOLIC BLOOD PRESSURE: 144 MMHG | RESPIRATION RATE: 18 BRPM

## 2020-06-10 VITALS
OXYGEN SATURATION: 96 % | RESPIRATION RATE: 18 BRPM | HEART RATE: 78 BPM | DIASTOLIC BLOOD PRESSURE: 76 MMHG | SYSTOLIC BLOOD PRESSURE: 144 MMHG

## 2020-06-10 VITALS — BODY MASS INDEX: 64.5 KG/M2

## 2020-06-10 DIAGNOSIS — F17.210: ICD-10-CM

## 2020-06-10 DIAGNOSIS — B37.81: Primary | ICD-10-CM

## 2020-06-10 DIAGNOSIS — E11.9: ICD-10-CM

## 2020-06-10 DIAGNOSIS — I10: ICD-10-CM

## 2020-06-10 DIAGNOSIS — K21.9: ICD-10-CM

## 2020-06-10 DIAGNOSIS — Z89.511: ICD-10-CM

## 2020-06-10 DIAGNOSIS — Z90.49: ICD-10-CM

## 2020-06-10 DIAGNOSIS — Z89.512: ICD-10-CM

## 2020-06-10 DIAGNOSIS — Z90.09: ICD-10-CM

## 2020-06-10 DIAGNOSIS — I25.10: ICD-10-CM

## 2020-06-10 DIAGNOSIS — J44.9: ICD-10-CM

## 2020-06-10 DIAGNOSIS — F41.9: ICD-10-CM

## 2020-06-10 DIAGNOSIS — J02.9: ICD-10-CM

## 2020-06-10 DIAGNOSIS — E78.5: ICD-10-CM

## 2020-06-10 PROCEDURE — 87880 STREP A ASSAY W/OPTIC: CPT

## 2020-06-10 PROCEDURE — U0004 COV-19 TEST NON-CDC HGH THRU: HCPCS

## 2020-06-10 PROCEDURE — 99202 OFFICE O/P NEW SF 15 MIN: CPT

## 2020-07-14 ENCOUNTER — HOSPITAL ENCOUNTER (OUTPATIENT)
Age: 60
End: 2020-07-14
Payer: MEDICARE

## 2020-07-14 DIAGNOSIS — E11.69: ICD-10-CM

## 2020-07-14 DIAGNOSIS — E78.5: ICD-10-CM

## 2020-07-14 LAB
CHOLEST SPEC-SCNC: 199 MG/DL (ref 140–200)
HBA1C MFR BLD: 7.2 % (ref 4–6)
HDLC SERPL-MCNC: 31 MG/DL (ref 40–60)
TRIGLYCERIDES: 140 MG/DL (ref 30–150)

## 2020-07-14 PROCEDURE — 83036 HEMOGLOBIN GLYCOSYLATED A1C: CPT

## 2020-07-14 PROCEDURE — 82043 UR ALBUMIN QUANTITATIVE: CPT

## 2020-07-14 PROCEDURE — 80061 LIPID PANEL: CPT

## 2020-07-14 PROCEDURE — 82570 ASSAY OF URINE CREATININE: CPT

## 2021-06-19 ENCOUNTER — HOSPITAL ENCOUNTER (OUTPATIENT)
Age: 61
End: 2021-06-19
Payer: MEDICARE

## 2021-06-19 DIAGNOSIS — M47.817: Primary | ICD-10-CM

## 2021-06-19 PROCEDURE — 72110 X-RAY EXAM L-2 SPINE 4/>VWS: CPT

## 2021-08-11 ENCOUNTER — HOSPITAL ENCOUNTER (OUTPATIENT)
Age: 61
End: 2021-08-11
Payer: MEDICARE

## 2021-08-11 DIAGNOSIS — Z89.512: ICD-10-CM

## 2021-08-11 DIAGNOSIS — Z89.511: ICD-10-CM

## 2021-08-11 DIAGNOSIS — M50.30: ICD-10-CM

## 2021-08-11 DIAGNOSIS — I10: ICD-10-CM

## 2021-08-11 DIAGNOSIS — J44.9: ICD-10-CM

## 2021-08-11 DIAGNOSIS — E11.9: Primary | ICD-10-CM

## 2021-08-11 DIAGNOSIS — Z79.84: ICD-10-CM

## 2021-08-11 DIAGNOSIS — I65.23: ICD-10-CM

## 2021-08-11 PROCEDURE — 93880 EXTRACRANIAL BILAT STUDY: CPT

## 2021-12-11 ENCOUNTER — HOSPITAL ENCOUNTER (OUTPATIENT)
Age: 61
End: 2021-12-11
Payer: MEDICARE

## 2021-12-11 DIAGNOSIS — M47.817: Primary | ICD-10-CM

## 2021-12-11 PROCEDURE — 72110 X-RAY EXAM L-2 SPINE 4/>VWS: CPT

## 2022-03-06 ENCOUNTER — HOSPITAL ENCOUNTER (INPATIENT)
Dept: HOSPITAL 22 - ER | Age: 62
LOS: 2 days | Discharge: HOME | DRG: 247 | End: 2022-03-08
Payer: MEDICARE

## 2022-03-06 VITALS
HEART RATE: 96 BPM | DIASTOLIC BLOOD PRESSURE: 82 MMHG | SYSTOLIC BLOOD PRESSURE: 130 MMHG | RESPIRATION RATE: 18 BRPM | OXYGEN SATURATION: 97 %

## 2022-03-06 VITALS
SYSTOLIC BLOOD PRESSURE: 121 MMHG | DIASTOLIC BLOOD PRESSURE: 100 MMHG | OXYGEN SATURATION: 92 % | HEART RATE: 85 BPM | RESPIRATION RATE: 16 BRPM

## 2022-03-06 VITALS
OXYGEN SATURATION: 92 % | SYSTOLIC BLOOD PRESSURE: 101 MMHG | RESPIRATION RATE: 16 BRPM | DIASTOLIC BLOOD PRESSURE: 65 MMHG | HEART RATE: 85 BPM

## 2022-03-06 VITALS
DIASTOLIC BLOOD PRESSURE: 70 MMHG | RESPIRATION RATE: 16 BRPM | OXYGEN SATURATION: 92 % | SYSTOLIC BLOOD PRESSURE: 136 MMHG | TEMPERATURE: 98.4 F | HEART RATE: 92 BPM

## 2022-03-06 VITALS
SYSTOLIC BLOOD PRESSURE: 143 MMHG | OXYGEN SATURATION: 98 % | RESPIRATION RATE: 18 BRPM | TEMPERATURE: 98.1 F | HEART RATE: 95 BPM | DIASTOLIC BLOOD PRESSURE: 35 MMHG

## 2022-03-06 VITALS
HEART RATE: 88 BPM | OXYGEN SATURATION: 93 % | DIASTOLIC BLOOD PRESSURE: 63 MMHG | SYSTOLIC BLOOD PRESSURE: 108 MMHG | RESPIRATION RATE: 16 BRPM

## 2022-03-06 VITALS
HEART RATE: 101 BPM | SYSTOLIC BLOOD PRESSURE: 158 MMHG | RESPIRATION RATE: 16 BRPM | OXYGEN SATURATION: 95 % | DIASTOLIC BLOOD PRESSURE: 87 MMHG

## 2022-03-06 VITALS
BODY MASS INDEX: 25.9 KG/M2 | HEART RATE: 130 BPM | BODY MASS INDEX: 25 KG/M2 | TEMPERATURE: 98.1 F | DIASTOLIC BLOOD PRESSURE: 98 MMHG | BODY MASS INDEX: 23 KG/M2 | SYSTOLIC BLOOD PRESSURE: 170 MMHG | RESPIRATION RATE: 20 BRPM | OXYGEN SATURATION: 95 % | BODY MASS INDEX: 23.5 KG/M2

## 2022-03-06 VITALS
OXYGEN SATURATION: 95 % | RESPIRATION RATE: 17 BRPM | DIASTOLIC BLOOD PRESSURE: 84 MMHG | HEART RATE: 86 BPM | SYSTOLIC BLOOD PRESSURE: 122 MMHG

## 2022-03-06 VITALS
DIASTOLIC BLOOD PRESSURE: 82 MMHG | RESPIRATION RATE: 18 BRPM | OXYGEN SATURATION: 96 % | HEART RATE: 95 BPM | SYSTOLIC BLOOD PRESSURE: 125 MMHG

## 2022-03-06 VITALS
DIASTOLIC BLOOD PRESSURE: 71 MMHG | RESPIRATION RATE: 20 BRPM | SYSTOLIC BLOOD PRESSURE: 120 MMHG | OXYGEN SATURATION: 98 % | HEART RATE: 93 BPM

## 2022-03-06 VITALS
RESPIRATION RATE: 17 BRPM | OXYGEN SATURATION: 92 % | HEART RATE: 84 BPM | SYSTOLIC BLOOD PRESSURE: 132 MMHG | DIASTOLIC BLOOD PRESSURE: 60 MMHG

## 2022-03-06 VITALS
DIASTOLIC BLOOD PRESSURE: 87 MMHG | HEART RATE: 94 BPM | OXYGEN SATURATION: 93 % | RESPIRATION RATE: 16 BRPM | SYSTOLIC BLOOD PRESSURE: 133 MMHG

## 2022-03-06 VITALS
OXYGEN SATURATION: 95 % | HEART RATE: 86 BPM | SYSTOLIC BLOOD PRESSURE: 131 MMHG | DIASTOLIC BLOOD PRESSURE: 84 MMHG | RESPIRATION RATE: 16 BRPM

## 2022-03-06 DIAGNOSIS — I21.02: Primary | ICD-10-CM

## 2022-03-06 DIAGNOSIS — F41.9: ICD-10-CM

## 2022-03-06 DIAGNOSIS — I25.10: ICD-10-CM

## 2022-03-06 DIAGNOSIS — I65.23: ICD-10-CM

## 2022-03-06 DIAGNOSIS — F17.210: ICD-10-CM

## 2022-03-06 DIAGNOSIS — Z85.9: ICD-10-CM

## 2022-03-06 DIAGNOSIS — E11.51: ICD-10-CM

## 2022-03-06 DIAGNOSIS — Z95.5: ICD-10-CM

## 2022-03-06 DIAGNOSIS — Z89.511: ICD-10-CM

## 2022-03-06 DIAGNOSIS — E78.5: ICD-10-CM

## 2022-03-06 DIAGNOSIS — J44.9: ICD-10-CM

## 2022-03-06 DIAGNOSIS — Z79.84: ICD-10-CM

## 2022-03-06 DIAGNOSIS — K21.9: ICD-10-CM

## 2022-03-06 DIAGNOSIS — Z89.512: ICD-10-CM

## 2022-03-06 LAB
ANION GAP SERPL CALC-SCNC: 10.1 MEQ/L (ref 5–15)
BUN SERPL-MCNC: 7 MG/DL (ref 9–20)
CALCIUM SPEC-MCNC: 8.5 MG/DL (ref 8.4–10.2)
CATHL ACTIVATED CLOTTING TIME: 229 SEC (ref 74–125)
CATHL ACTIVATED CLOTTING TIME: 349 SEC (ref 74–125)
CHLORIDE SPEC-SCNC: 96 MMOL/L (ref 98–107)
CO2 SERPL-SCNC: 32 MMOL/L (ref 22–30)
CREAT BLD-SCNC: 0.6 MG/DL (ref 0.66–1.25)
ESTIMATED GLOMERULAR FILT RATE: 137 ML/MIN (ref 60–?)
GFR (AFRICAN AMERICAN): 166 ML/MIN (ref 60–?)
GLUCOSE: 328 MG/DL (ref 74–100)
HCT VFR BLD CALC: 53.5 % (ref 42–52)
HGB BLD-MCNC: 16.8 G/DL (ref 14.1–18)
MCHC RBC-ENTMCNC: 31.4 G/DL (ref 31.8–35.4)
MCV RBC: 100 FL (ref 80–94)
MEAN CORPUSCULAR HEMOGLOBIN: 31.4 PG (ref 27–31.2)
PLATELET # BLD: 234 K/MM3 (ref 142–424)
POTASSIUM: 4.1 MMOL/L (ref 3.5–5.1)
RBC # BLD AUTO: 5.35 M/MM3 (ref 4.6–6.2)
SODIUM SPEC-SCNC: 134 MMOL/L (ref 136–145)
TROPONIN I: 2.85 NG/ML (ref 0–0.03)
WBC # BLD AUTO: 13.1 K/MM3 (ref 4.8–10.8)

## 2022-03-06 PROCEDURE — 96374 THER/PROPH/DIAG INJ IV PUSH: CPT

## 2022-03-06 PROCEDURE — 99285 EMERGENCY DEPT VISIT HI MDM: CPT

## 2022-03-06 PROCEDURE — 85007 BL SMEAR W/DIFF WBC COUNT: CPT

## 2022-03-06 PROCEDURE — 85347 COAGULATION TIME ACTIVATED: CPT

## 2022-03-06 PROCEDURE — C1876 STENT, NON-COA/NON-COV W/DEL: HCPCS

## 2022-03-06 PROCEDURE — C9606 PERC D-E COR REVASC W AMI S: HCPCS

## 2022-03-06 PROCEDURE — C1725 CATH, TRANSLUMIN NON-LASER: HCPCS

## 2022-03-06 PROCEDURE — 93005 ELECTROCARDIOGRAM TRACING: CPT

## 2022-03-06 PROCEDURE — 99153 MOD SED SAME PHYS/QHP EA: CPT

## 2022-03-06 PROCEDURE — 84484 ASSAY OF TROPONIN QUANT: CPT

## 2022-03-06 PROCEDURE — 96375 TX/PRO/DX INJ NEW DRUG ADDON: CPT

## 2022-03-06 PROCEDURE — 93306 TTE W/DOPPLER COMPLETE: CPT

## 2022-03-06 PROCEDURE — 99152 MOD SED SAME PHYS/QHP 5/>YRS: CPT

## 2022-03-06 PROCEDURE — C1769 GUIDE WIRE: HCPCS

## 2022-03-06 PROCEDURE — 92941 PRQ TRLML REVSC TOT OCCL AMI: CPT

## 2022-03-06 PROCEDURE — 027034Z DILATION OF CORONARY ARTERY, ONE ARTERY WITH DRUG-ELUTING INTRALUMINAL DEVICE, PERCUTANEOUS APPROACH: ICD-10-PCS | Performed by: INTERNAL MEDICINE

## 2022-03-06 PROCEDURE — 93454 CORONARY ARTERY ANGIO S&I: CPT

## 2022-03-06 PROCEDURE — 36415 COLL VENOUS BLD VENIPUNCTURE: CPT

## 2022-03-06 PROCEDURE — 82962 GLUCOSE BLOOD TEST: CPT

## 2022-03-06 PROCEDURE — 85025 COMPLETE CBC W/AUTO DIFF WBC: CPT

## 2022-03-06 PROCEDURE — 80048 BASIC METABOLIC PNL TOTAL CA: CPT

## 2022-03-06 PROCEDURE — 71045 X-RAY EXAM CHEST 1 VIEW: CPT

## 2022-03-07 VITALS
DIASTOLIC BLOOD PRESSURE: 52 MMHG | HEART RATE: 65 BPM | OXYGEN SATURATION: 96 % | SYSTOLIC BLOOD PRESSURE: 108 MMHG | RESPIRATION RATE: 13 BRPM

## 2022-03-07 VITALS
SYSTOLIC BLOOD PRESSURE: 132 MMHG | HEART RATE: 78 BPM | OXYGEN SATURATION: 97 % | RESPIRATION RATE: 20 BRPM | DIASTOLIC BLOOD PRESSURE: 75 MMHG

## 2022-03-07 VITALS
OXYGEN SATURATION: 96 % | DIASTOLIC BLOOD PRESSURE: 74 MMHG | SYSTOLIC BLOOD PRESSURE: 112 MMHG | HEART RATE: 81 BPM | RESPIRATION RATE: 16 BRPM

## 2022-03-07 VITALS
SYSTOLIC BLOOD PRESSURE: 95 MMHG | DIASTOLIC BLOOD PRESSURE: 55 MMHG | RESPIRATION RATE: 25 BRPM | TEMPERATURE: 98.8 F | HEART RATE: 65 BPM | OXYGEN SATURATION: 90 %

## 2022-03-07 VITALS
RESPIRATION RATE: 16 BRPM | HEART RATE: 88 BPM | OXYGEN SATURATION: 91 % | SYSTOLIC BLOOD PRESSURE: 132 MMHG | DIASTOLIC BLOOD PRESSURE: 76 MMHG

## 2022-03-07 VITALS
OXYGEN SATURATION: 92 % | RESPIRATION RATE: 16 BRPM | DIASTOLIC BLOOD PRESSURE: 84 MMHG | SYSTOLIC BLOOD PRESSURE: 135 MMHG | TEMPERATURE: 98.9 F | HEART RATE: 85 BPM

## 2022-03-07 VITALS
DIASTOLIC BLOOD PRESSURE: 79 MMHG | SYSTOLIC BLOOD PRESSURE: 120 MMHG | HEART RATE: 83 BPM | OXYGEN SATURATION: 96 % | RESPIRATION RATE: 20 BRPM

## 2022-03-07 VITALS
DIASTOLIC BLOOD PRESSURE: 91 MMHG | HEART RATE: 54 BPM | SYSTOLIC BLOOD PRESSURE: 154 MMHG | RESPIRATION RATE: 15 BRPM | OXYGEN SATURATION: 92 % | TEMPERATURE: 98.1 F

## 2022-03-07 VITALS
DIASTOLIC BLOOD PRESSURE: 73 MMHG | SYSTOLIC BLOOD PRESSURE: 122 MMHG | HEART RATE: 91 BPM | TEMPERATURE: 98.96 F | RESPIRATION RATE: 18 BRPM | OXYGEN SATURATION: 95 %

## 2022-03-07 VITALS
HEART RATE: 90 BPM | OXYGEN SATURATION: 95 % | DIASTOLIC BLOOD PRESSURE: 70 MMHG | SYSTOLIC BLOOD PRESSURE: 121 MMHG | RESPIRATION RATE: 16 BRPM

## 2022-03-07 VITALS
HEART RATE: 64 BPM | DIASTOLIC BLOOD PRESSURE: 68 MMHG | RESPIRATION RATE: 19 BRPM | OXYGEN SATURATION: 93 % | SYSTOLIC BLOOD PRESSURE: 112 MMHG | TEMPERATURE: 97.9 F

## 2022-03-07 VITALS
RESPIRATION RATE: 16 BRPM | OXYGEN SATURATION: 92 % | DIASTOLIC BLOOD PRESSURE: 97 MMHG | SYSTOLIC BLOOD PRESSURE: 119 MMHG | HEART RATE: 87 BPM

## 2022-03-07 VITALS
HEART RATE: 78 BPM | RESPIRATION RATE: 17 BRPM | DIASTOLIC BLOOD PRESSURE: 79 MMHG | OXYGEN SATURATION: 93 % | SYSTOLIC BLOOD PRESSURE: 128 MMHG

## 2022-03-07 VITALS
DIASTOLIC BLOOD PRESSURE: 63 MMHG | RESPIRATION RATE: 16 BRPM | HEART RATE: 67 BPM | SYSTOLIC BLOOD PRESSURE: 95 MMHG | OXYGEN SATURATION: 96 %

## 2022-03-07 VITALS — HEART RATE: 90 BPM

## 2022-03-07 VITALS
OXYGEN SATURATION: 99 % | SYSTOLIC BLOOD PRESSURE: 115 MMHG | HEART RATE: 73 BPM | DIASTOLIC BLOOD PRESSURE: 82 MMHG | RESPIRATION RATE: 19 BRPM

## 2022-03-07 LAB
ANION GAP SERPL CALC-SCNC: 11.3 MEQ/L (ref 5–15)
BUN SERPL-MCNC: 5 MG/DL (ref 9–20)
CALCIUM SPEC-MCNC: 8.7 MG/DL (ref 8.4–10.2)
CELLS COUNTED: 100
CHLORIDE SPEC-SCNC: 100 MMOL/L (ref 98–107)
CO2 SERPL-SCNC: 30 MMOL/L (ref 22–30)
CREAT BLD-SCNC: 0.5 MG/DL (ref 0.66–1.25)
CREATININE CLEARANCE ESTIMATED: 82 ML/MIN (ref 50–200)
ESTIMATED GLOMERULAR FILT RATE: 169 ML/MIN (ref 60–?)
GFR (AFRICAN AMERICAN): 205 ML/MIN (ref 60–?)
GLUCOSE BLDC GLUCOMTR-MCNC: 168 MG/DL (ref 70–110)
GLUCOSE BLDC GLUCOMTR-MCNC: 204 MG/DL (ref 70–110)
GLUCOSE BLDC GLUCOMTR-MCNC: 358 MG/DL (ref 70–110)
GLUCOSE: 268 MG/DL (ref 74–100)
HCT VFR BLD CALC: 52.6 % (ref 42–52)
HGB BLD-MCNC: 16.7 G/DL (ref 14.1–18)
HYPOCHROMIA BLD QL: (no result)
MACROCYTES BLD QL: (no result)
MANUAL DIFFERENTIAL: (no result)
MCHC RBC-ENTMCNC: 31.7 G/DL (ref 31.8–35.4)
MCV RBC: 100.4 FL (ref 80–94)
MEAN CORPUSCULAR HEMOGLOBIN: 31.9 PG (ref 27–31.2)
PLATELET # BLD: 224 K/MM3 (ref 142–424)
POTASSIUM: 4.3 MMOL/L (ref 3.5–5.1)
RBC # BLD AUTO: 5.24 M/MM3 (ref 4.6–6.2)
SODIUM SPEC-SCNC: 137 MMOL/L (ref 136–145)
WBC # BLD AUTO: 17.7 K/MM3 (ref 4.8–10.8)

## 2022-03-07 NOTE — PC.NURSE
Pt is alert and oriented x4. Some wheezes noted to lungs. He was weaned from 9L venti mask to 2L NC w/O2 sats measuring in the upper 90's now. He does desat into the 80's on occasion when sleeping deeply. Will continue to wean as tolerated. He has

## 2022-03-07 NOTE — HMH.PHAINT
Home medication list verified using list from [facility] pharmacy and pt interview

## 2022-03-07 NOTE — PC.NURSE
Pt has remained on O2 since arrival to floor. Pt noted to desat while in a deep sleep. Pt was placed on 35% venti via RT. Pt has denied any soa or discomfort. He stated that he has a sinus infection. (R) radial cath site has DSG in place. C/D/I.

## 2022-03-08 VITALS
TEMPERATURE: 98.7 F | HEART RATE: 74 BPM | OXYGEN SATURATION: 94 % | RESPIRATION RATE: 22 BRPM | SYSTOLIC BLOOD PRESSURE: 153 MMHG | DIASTOLIC BLOOD PRESSURE: 58 MMHG

## 2022-03-08 VITALS
DIASTOLIC BLOOD PRESSURE: 67 MMHG | SYSTOLIC BLOOD PRESSURE: 111 MMHG | HEART RATE: 63 BPM | OXYGEN SATURATION: 97 % | RESPIRATION RATE: 20 BRPM

## 2022-03-08 VITALS
DIASTOLIC BLOOD PRESSURE: 52 MMHG | OXYGEN SATURATION: 90 % | SYSTOLIC BLOOD PRESSURE: 86 MMHG | RESPIRATION RATE: 14 BRPM | HEART RATE: 70 BPM

## 2022-03-08 VITALS
DIASTOLIC BLOOD PRESSURE: 43 MMHG | HEART RATE: 74 BPM | OXYGEN SATURATION: 92 % | SYSTOLIC BLOOD PRESSURE: 84 MMHG | RESPIRATION RATE: 13 BRPM

## 2022-03-08 VITALS
DIASTOLIC BLOOD PRESSURE: 51 MMHG | OXYGEN SATURATION: 90 % | RESPIRATION RATE: 16 BRPM | SYSTOLIC BLOOD PRESSURE: 92 MMHG | HEART RATE: 70 BPM

## 2022-03-08 VITALS
TEMPERATURE: 99.1 F | SYSTOLIC BLOOD PRESSURE: 91 MMHG | DIASTOLIC BLOOD PRESSURE: 54 MMHG | OXYGEN SATURATION: 90 % | HEART RATE: 79 BPM | RESPIRATION RATE: 16 BRPM

## 2022-03-08 LAB
ANION GAP SERPL CALC-SCNC: 8.7 MEQ/L (ref 5–15)
BUN SERPL-MCNC: 12 MG/DL (ref 9–20)
CALCIUM SPEC-MCNC: 9.1 MG/DL (ref 8.4–10.2)
CHLORIDE SPEC-SCNC: 100 MMOL/L (ref 98–107)
CO2 SERPL-SCNC: 33 MMOL/L (ref 22–30)
CREAT BLD-SCNC: 0.6 MG/DL (ref 0.66–1.25)
CREATININE CLEARANCE ESTIMATED: 73 ML/MIN (ref 50–200)
ESTIMATED GLOMERULAR FILT RATE: 137 ML/MIN (ref 60–?)
GFR (AFRICAN AMERICAN): 166 ML/MIN (ref 60–?)
GLUCOSE BLDC GLUCOMTR-MCNC: 232 MG/DL (ref 70–110)
GLUCOSE: 212 MG/DL (ref 74–100)
HCT VFR BLD CALC: 51.9 % (ref 42–52)
HGB BLD-MCNC: 16.3 G/DL (ref 14.1–18)
MCHC RBC-ENTMCNC: 31.3 G/DL (ref 31.8–35.4)
MCV RBC: 102.1 FL (ref 80–94)
MEAN CORPUSCULAR HEMOGLOBIN: 32 PG (ref 27–31.2)
PLATELET # BLD: 213 K/MM3 (ref 142–424)
POTASSIUM: 3.7 MMOL/L (ref 3.5–5.1)
RBC # BLD AUTO: 5.08 M/MM3 (ref 4.6–6.2)
SODIUM SPEC-SCNC: 138 MMOL/L (ref 136–145)
WBC # BLD AUTO: 13 K/MM3 (ref 4.8–10.8)

## 2022-03-09 NOTE — CARE MANAGER
Called ans spoke with patient, he is at home and doing well. He has follow-up appointments with Dr. Friedman and Dr. Lopez, he also has his meds. No issues noted.

## 2022-03-30 ENCOUNTER — TELEPHONE (OUTPATIENT)
Dept: CARDIAC SURGERY | Facility: CLINIC | Age: 62
End: 2022-03-30

## 2022-03-30 NOTE — TELEPHONE ENCOUNTER
Called the referring phys. For this pt. Dr. Isac Brown in [place].  Left message for his referral coordinator that we will not be able to schedule this patient for a consult for surgery with Dr. Mendez.  Dr. Mendez has reviewed the patient's information and recommends pt. Have surgery in a [facility] setting due to all the co-morbidities of the patient.  [facility]

## 2022-04-12 RX ORDER — ASPIRIN 81 MG/1
81 TABLET ORAL DAILY
COMMUNITY

## 2022-04-12 RX ORDER — GLIPIZIDE 10 MG/1
10 TABLET ORAL
COMMUNITY

## 2022-04-12 RX ORDER — BISOPROLOL FUMARATE 10 MG/1
10 TABLET ORAL DAILY
COMMUNITY

## 2022-04-12 RX ORDER — OXYCODONE HYDROCHLORIDE 15 MG/1
15 TABLET ORAL EVERY 6 HOURS PRN
COMMUNITY

## 2022-04-12 RX ORDER — CLOPIDOGREL BISULFATE 75 MG/1
75 TABLET ORAL DAILY
COMMUNITY

## 2022-04-12 RX ORDER — BUDESONIDE AND FORMOTEROL FUMARATE DIHYDRATE 80; 4.5 UG/1; UG/1
2 AEROSOL RESPIRATORY (INHALATION) 2 TIMES DAILY
COMMUNITY

## 2022-04-12 RX ORDER — ATORVASTATIN CALCIUM 80 MG/1
80 TABLET, FILM COATED ORAL DAILY
COMMUNITY

## 2022-04-12 RX ORDER — GABAPENTIN 800 MG/1
800 TABLET ORAL 4 TIMES DAILY
COMMUNITY

## 2022-04-12 RX ORDER — INSULIN GLARGINE 100 [IU]/ML
10 INJECTION, SOLUTION SUBCUTANEOUS NIGHTLY
COMMUNITY

## 2022-04-12 RX ORDER — IRBESARTAN 150 MG/1
150 TABLET ORAL DAILY
COMMUNITY

## 2022-04-13 ENCOUNTER — OFFICE VISIT (OUTPATIENT)
Dept: CARDIOTHORACIC SURGERY | Age: 62
End: 2022-04-13
Payer: COMMERCIAL

## 2022-04-13 VITALS
OXYGEN SATURATION: 94 % | TEMPERATURE: 97.4 F | DIASTOLIC BLOOD PRESSURE: 56 MMHG | HEART RATE: 111 BPM | SYSTOLIC BLOOD PRESSURE: 96 MMHG | WEIGHT: 176 LBS

## 2022-04-13 DIAGNOSIS — F17.210 SMOKING GREATER THAN 40 PACK YEARS: ICD-10-CM

## 2022-04-13 DIAGNOSIS — I73.9 PAD (PERIPHERAL ARTERY DISEASE) (HCC): ICD-10-CM

## 2022-04-13 DIAGNOSIS — I25.10 CORONARY ARTERY DISEASE INVOLVING NATIVE CORONARY ARTERY OF NATIVE HEART WITHOUT ANGINA PECTORIS: Primary | ICD-10-CM

## 2022-04-13 DIAGNOSIS — I65.23 BILATERAL CAROTID ARTERY STENOSIS: ICD-10-CM

## 2022-04-13 PROCEDURE — 99204 OFFICE O/P NEW MOD 45 MIN: CPT | Performed by: THORACIC SURGERY (CARDIOTHORACIC VASCULAR SURGERY)

## 2022-04-13 RX ORDER — NITROGLYCERIN 0.4 MG/1
0.4 TABLET SUBLINGUAL EVERY 5 MIN PRN
COMMUNITY

## 2022-04-13 ASSESSMENT — ENCOUNTER SYMPTOMS
CHEST TIGHTNESS: 0
CONSTIPATION: 1
ABDOMINAL PAIN: 0
SHORTNESS OF BREATH: 0
BLOOD IN STOOL: 0
DIARRHEA: 0
EYES NEGATIVE: 1
NAUSEA: 0
COUGH: 0
ALLERGIC/IMMUNOLOGIC NEGATIVE: 1
BACK PAIN: 1
WHEEZING: 0
TROUBLE SWALLOWING: 0
APNEA: 1

## 2022-04-13 NOTE — PROGRESS NOTES
Subjective:      Patient ID: Diane Celis is a 64 y.o. male. Chief Complaint   Patient presents with   Tomasa Saroj Patient     Mr. Joseph Pa is being seen today at the request of Dr. Tahsa Marie for consideration of CABG. HPI Diane Celis is a 70-year-old man who is sent in referral from Dr. Tasha Marie in [place]. The patient had recently been admitted to the hospital in [facility] with an acute myocardial infarction secondary to closure of his left anterior descending. This was opened with a stent. There is significant residual multivessel coronary artery disease. He had been seen at [facility] in consultation for coronary bypass surgery and was turned down secondary to his high risk profile. 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 He had one prior heart attack. Is lost multiple family members secondary to complications from smoking. And yet he continues to smoke between 1-1/2 packs of cigarettes per day. Review of Systems   Constitutional: Positive for activity change and fatigue. HENT: Positive for nosebleeds. Negative for dental problem (All teeth have been pulled) and trouble swallowing. Eyes: Negative. Respiratory: Positive for apnea. Negative for cough, chest tightness, shortness of breath and wheezing. Cardiovascular: Positive for chest pain (Intermittent). Negative for palpitations. Gastrointestinal: Positive for constipation. Negative for abdominal pain, blood in stool, diarrhea and nausea. Endocrine: Negative. Genitourinary: Positive for difficulty urinating (+ difficulty starting stream w/burning occ. ). Negative for dysuria and hematuria. Musculoskeletal: Positive for back pain (Lower back pain), gait problem (Bilateral AKA) and neck pain. +phantom pain to bilateral legs   Skin: Negative for pallor, rash and wound.         +Dry skin to right elbow   Allergic/Immunologic: Negative. Neurological: Negative for dizziness, syncope and light-headedness. Hematological: Negative. Psychiatric/Behavioral: Negative for sleep disturbance. The patient is nervous/anxious. +Depression     Past Medical History:   Diagnosis Date    Bilateral carotid artery stenosis     CAD (coronary artery disease)     COPD (chronic obstructive pulmonary disease) (HCC)     Diabetes (HCC)     Hyperlipidemia     Hypertension     MI (myocardial infarction) ([facility] [facility]     STEMI    PVD (peripheral vascular disease) ([facility] [address] [facility]     Tobacco dependence      Past Surgical History:   Procedure Laterality Date    CARDIAC CATHETERIZATION Bilateral 03/06/2022    Stenting of subtotal mid LAD occlusion    CAROTID ENDARTERECTOMY Left 2010    CERVICAL SPINE SURGERY      C4-C5    CHOLECYSTECTOMY      CORONARY ANGIOPLASTY WITH STENT PLACEMENT  2019    x2    FEMORAL BYPASS  2012    INGUINAL HERNIA REPAIR Bilateral     LEG AMPUTATION BELOW KNEE Bilateral     TONSILLECTOMY       No Known Allergies  Current Outpatient Medications   Medication Sig Dispense Refill    nitroGLYCERIN (NITROSTAT) 0.4 MG SL tablet Place 0.4 mg under the tongue every 5 minutes as needed for Chest pain up to max of 3 total doses. If no relief after 1 dose, call 911.  aspirin 81 MG EC tablet Take 81 mg by mouth daily      atorvastatin (LIPITOR) 80 MG tablet Take 80 mg by mouth daily      bisoprolol (ZEBETA) 10 MG tablet Take 10 mg by mouth daily      budesonide-formoterol (SYMBICORT) 80-4.5 MCG/ACT AERO Inhale 2 puffs into the lungs 2 times daily      clopidogrel (PLAVIX) 75 MG tablet Take 75 mg by mouth daily      gabapentin (NEURONTIN) 800 MG tablet Take 800 mg by mouth 4 times daily.       glipiZIDE (GLUCOTROL) 10 MG tablet Take 10 mg by mouth 2 times daily (before meals)      insulin glargine (LANTUS) 100 UNIT/ML injection vial Inject 10 Units into the skin nightly      irbesartan (AVAPRO) 150 MG tablet Take 150 mg by mouth daily      LANSOPRAZOLE PO Take 30 mg by mouth daily      linagliptin (TRADJENTA) 5 MG tablet Take 5 mg by mouth daily      metFORMIN (GLUCOPHAGE) 1000 MG tablet Take 1,000 mg by mouth 2 times daily (with meals)      oxyCODONE (OXY-IR) 15 MG immediate release tablet Take 15 mg by mouth every 6 hours as needed for Pain. No current facility-administered medications for this visit. Social History     Socioeconomic History    Marital status: Unknown     Spouse name: Not on file    Number of children: Not on file    Years of education: Not on file    Highest education level: Not on file   Occupational History    Not on file   Tobacco Use    Smoking status: Current Every Day Smoker     Packs/day: 1.00     Years: 47.00     Pack years: 47.00     Types: Cigarettes    Smokeless tobacco: Former User   Vaping Use    Vaping Use: Never used   Substance and Sexual Activity    Alcohol use: Never    Drug use: Yes     Types: Fentanyl    Sexual activity: Not on file   Other Topics Concern    Not on file   Social History Narrative    Not on file     Social Determinants of Health     Financial Resource Strain:     Difficulty of Paying Living Expenses: Not on file   Food Insecurity:     Worried About [address] in the Last Year: Not on file    [address] in the Last Year: Not on file   Transportation Needs:     Lack of Transportation (Medical): Not on file    Lack of Transportation (Non-Medical):  Not on file   Physical Activity:     Days of Exercise per Week: Not on file    Minutes of Exercise per Session: Not on file   Stress:     Feeling of Stress : Not on file   Social Connections:     Frequency of Communication with Friends and Family: Not on file    Frequency of Social Gatherings with Friends and Family: Not on file    Attends Taoism Services: Not on file    Active Member of Clubs or Organizations: Not on file    Attends Club or Organization Meetings: Not on file    Marital Status: Not on file   Intimate Partner Violence:     Fear of Current or Ex-Partner: Not on file    Emotionally Abused: Not on file    Physically Abused: Not on file    Sexually Abused: Not on file   Housing Stability:     Unable to Pay for Housing in the Last Year: Not on file    Number of [place] in the Last Year: Not on file    Unstable Housing in the Last Year: Not on file     Family History   Problem Relation Age of Onset    Heart Disease Mother     Diabetes Father     Heart Disease Father     Heart Surgery Father     Heart Disease Brother     Heart Attack Brother     Heart Disease Brother      Objective:   Physical Exam  Vitals:    04/13/22 1307 04/13/22 1314   BP: 102/60 (!) 96/56   Site: Right Upper Arm Left Upper Arm   Position: Sitting Sitting   Pulse: 111    Temp: 97.4 °F (36.3 °C)    TempSrc: Infrared    SpO2: 94%    Weight: 176 lb (79.8 kg)      Personal review of the left heart catheterization formed on May 6 of this year was a nice technical result from the stenting of his occluded left anterior descending. Every other element of his coronary tree has significant stenoses with reasonable downstream targets for grafting. Ventricular ejection fraction is reasonably preserved. Assessment: We will multivessel coronary artery disease that in and of itself is not really challenging. It is challenging is the fact that Mr. Chelsea Brian continues to smoke quite more evidence than anyone would ever need to separate himself from this unfortunate habit. Additionally he has no venous conduit available and would need to have alternative conduit such as the use of a radial artery or mammary arteries performed as grafting procedure. Finally there are the unknowns of what else is lurking in his vascular tree, particularly within his abdomen, that might pose problems he is exposed to the heart-lung machine.       Plan:      There is no question that Mr. Chelsea Brian presents a very high risk coronary artery bypass grafting procedure. The actual distal targets do not the challenge. We have my hope that his radial and mammary arteries are usable as a conduits. He will need extensive work-up of his vasculature to make sure we do not have any other hidden risks from his carotids, his aorta, his brachiocephalic vessels and his visceral vessels. If indeed we can establish that all these above-mentioned issues are not seasonally problematic, the next question relates to his smoking. Told Mr. Cassidy Negron that there is no possible way that I would perform his surgery if he is not completely  and free of cigarettes for at least a month prior to his surgery. He was hoping he could stop the day of surgery and then get through the immediate withdrawal while he was in the hospital.  I told him in no uncertain terms that this was not acceptable and that I did not know he was committed to quitting smoking prior to his surgery. This is necessary both for the recovery of his lungs and for him to optimize his overall global perfusion once he is free of nicotine and the other secondary influences of tobacco.    Breathing this made him cry. He did admit that I did not see anything that he did not expect to hear from me. He knows that he is at a Crossroads. He knows that if he continues to smoke that he will have to accept the consequences and provide his time until mother nature comes to the conclusion she always does in circumstances like this. He also understands that his best chance and hope for long-term survival is coronary artery bypass grafting. Fundamentally he has to decide if he wants to live or not. He will not be able to have his cake and eat it too. At the conclusion of our discussion elected that he is going to go home and about everything we talked about today and make a decision.   If he decides to move forward with surgery, which I hope he does, then he knows we will do a blood test back to make sure that he is remaining cigarette free. I told him very directly that if we found that he was continuing to smoke and hoping we didn't notice, we would simply cancel his surgery and allow him to move on with this path of life. We will await his decision. My hope is that he makes the right choice for his own sake.

## 2022-04-21 ENCOUNTER — TELEPHONE (OUTPATIENT)
Dept: CARDIOTHORACIC SURGERY | Age: 62
End: 2022-04-21

## 2022-04-21 NOTE — TELEPHONE ENCOUNTER
Patient called to verify that he had to be cigarette free for 4 weeks before proceeding with surgery. I reviewed with  Dr. Jeremías Rodriguez and patient DOES need to be cigarette free for 4 weeks prior to surgery. Patient instructed to contact the office once he has accomplished this. At that time we will proceed with pre-op testing which will include a carboxyhemoglobin. Patient is aware and agreeable with plan.

## 2022-04-23 ENCOUNTER — HOSPITAL ENCOUNTER (OUTPATIENT)
Dept: HOSPITAL 22 - ER | Age: 62
Setting detail: OBSERVATION
LOS: 3 days | Discharge: HOME | DRG: 247 | End: 2022-04-26
Payer: MEDICARE

## 2022-04-23 VITALS
OXYGEN SATURATION: 97 % | RESPIRATION RATE: 14 BRPM | SYSTOLIC BLOOD PRESSURE: 128 MMHG | HEART RATE: 86 BPM | DIASTOLIC BLOOD PRESSURE: 85 MMHG

## 2022-04-23 VITALS
OXYGEN SATURATION: 98 % | SYSTOLIC BLOOD PRESSURE: 141 MMHG | DIASTOLIC BLOOD PRESSURE: 87 MMHG | HEART RATE: 78 BPM | RESPIRATION RATE: 16 BRPM

## 2022-04-23 VITALS
HEART RATE: 90 BPM | RESPIRATION RATE: 17 BRPM | OXYGEN SATURATION: 98 % | DIASTOLIC BLOOD PRESSURE: 98 MMHG | SYSTOLIC BLOOD PRESSURE: 198 MMHG

## 2022-04-23 VITALS
SYSTOLIC BLOOD PRESSURE: 192 MMHG | OXYGEN SATURATION: 98 % | RESPIRATION RATE: 12 BRPM | HEART RATE: 80 BPM | DIASTOLIC BLOOD PRESSURE: 95 MMHG

## 2022-04-23 VITALS
HEART RATE: 97 BPM | OXYGEN SATURATION: 95 % | SYSTOLIC BLOOD PRESSURE: 159 MMHG | RESPIRATION RATE: 16 BRPM | DIASTOLIC BLOOD PRESSURE: 96 MMHG

## 2022-04-23 VITALS
DIASTOLIC BLOOD PRESSURE: 102 MMHG | OXYGEN SATURATION: 97 % | HEART RATE: 117 BPM | RESPIRATION RATE: 14 BRPM | SYSTOLIC BLOOD PRESSURE: 185 MMHG

## 2022-04-23 VITALS
SYSTOLIC BLOOD PRESSURE: 190 MMHG | TEMPERATURE: 98.06 F | HEART RATE: 87 BPM | RESPIRATION RATE: 16 BRPM | DIASTOLIC BLOOD PRESSURE: 100 MMHG | OXYGEN SATURATION: 100 %

## 2022-04-23 VITALS
RESPIRATION RATE: 13 BRPM | TEMPERATURE: 98.1 F | HEART RATE: 113 BPM | OXYGEN SATURATION: 97 % | DIASTOLIC BLOOD PRESSURE: 102 MMHG | SYSTOLIC BLOOD PRESSURE: 185 MMHG

## 2022-04-23 VITALS
SYSTOLIC BLOOD PRESSURE: 139 MMHG | OXYGEN SATURATION: 95 % | DIASTOLIC BLOOD PRESSURE: 81 MMHG | RESPIRATION RATE: 12 BRPM | HEART RATE: 92 BPM

## 2022-04-23 VITALS
RESPIRATION RATE: 17 BRPM | DIASTOLIC BLOOD PRESSURE: 86 MMHG | OXYGEN SATURATION: 97 % | TEMPERATURE: 98.06 F | HEART RATE: 75 BPM | SYSTOLIC BLOOD PRESSURE: 165 MMHG

## 2022-04-23 VITALS
DIASTOLIC BLOOD PRESSURE: 88 MMHG | HEART RATE: 85 BPM | OXYGEN SATURATION: 97 % | RESPIRATION RATE: 12 BRPM | SYSTOLIC BLOOD PRESSURE: 129 MMHG

## 2022-04-23 VITALS
OXYGEN SATURATION: 96 % | HEART RATE: 96 BPM | SYSTOLIC BLOOD PRESSURE: 146 MMHG | DIASTOLIC BLOOD PRESSURE: 74 MMHG | RESPIRATION RATE: 14 BRPM

## 2022-04-23 VITALS
BODY MASS INDEX: 54.8 KG/M2 | BODY MASS INDEX: 55.5 KG/M2 | BODY MASS INDEX: 53.8 KG/M2 | BODY MASS INDEX: 54 KG/M2 | BODY MASS INDEX: 65.7 KG/M2

## 2022-04-23 VITALS
HEART RATE: 94 BPM | RESPIRATION RATE: 13 BRPM | DIASTOLIC BLOOD PRESSURE: 69 MMHG | OXYGEN SATURATION: 96 % | SYSTOLIC BLOOD PRESSURE: 122 MMHG

## 2022-04-23 VITALS
SYSTOLIC BLOOD PRESSURE: 190 MMHG | HEART RATE: 87 BPM | OXYGEN SATURATION: 100 % | DIASTOLIC BLOOD PRESSURE: 100 MMHG | RESPIRATION RATE: 16 BRPM | TEMPERATURE: 98.1 F

## 2022-04-23 VITALS
DIASTOLIC BLOOD PRESSURE: 96 MMHG | OXYGEN SATURATION: 99 % | SYSTOLIC BLOOD PRESSURE: 181 MMHG | HEART RATE: 91 BPM | RESPIRATION RATE: 13 BRPM

## 2022-04-23 DIAGNOSIS — F17.210: Primary | ICD-10-CM

## 2022-04-23 DIAGNOSIS — I65.21: ICD-10-CM

## 2022-04-23 DIAGNOSIS — Z71.6: ICD-10-CM

## 2022-04-23 DIAGNOSIS — I10: ICD-10-CM

## 2022-04-23 DIAGNOSIS — Z89.611: ICD-10-CM

## 2022-04-23 DIAGNOSIS — Z79.4: ICD-10-CM

## 2022-04-23 DIAGNOSIS — I25.10: ICD-10-CM

## 2022-04-23 DIAGNOSIS — Z95.5: ICD-10-CM

## 2022-04-23 DIAGNOSIS — T82.855A: ICD-10-CM

## 2022-04-23 DIAGNOSIS — J44.9: ICD-10-CM

## 2022-04-23 DIAGNOSIS — Z89.612: ICD-10-CM

## 2022-04-23 DIAGNOSIS — Z85.828: ICD-10-CM

## 2022-04-23 DIAGNOSIS — E11.51: ICD-10-CM

## 2022-04-23 DIAGNOSIS — I21.4: ICD-10-CM
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
## 2022-04-23 DIAGNOSIS — E78.5: ICD-10-CM

## 2022-04-23 LAB
ANION GAP SERPL CALC-SCNC: 6.4 MEQ/L (ref 5–15)
BUN SERPL-MCNC: 5 MG/DL (ref 9–20)
CALCIUM SPEC-MCNC: 8.6 MG/DL (ref 8.4–10.2)
CHLORIDE SPEC-SCNC: 100 MMOL/L (ref 98–107)
CO2 SERPL-SCNC: 36 MMOL/L (ref 22–30)
CREAT BLD-SCNC: 0.7 MG/DL (ref 0.66–1.25)
CREATININE CLEARANCE ESTIMATED: 9 ML/MIN (ref 50–200)
ESTIMATED GLOMERULAR FILT RATE: 115 ML/MIN (ref 60–?)
GFR (AFRICAN AMERICAN): 139 ML/MIN (ref 60–?)
GLUCOSE: 299 MG/DL (ref 74–100)
HCT VFR BLD CALC: 50.4 % (ref 42–52)
HGB BLD-MCNC: 16.3 G/DL (ref 14.1–18)
MCHC RBC-ENTMCNC: 32.3 G/DL (ref 31.8–35.4)
MCV RBC: 98.3 FL (ref 80–94)
MEAN CORPUSCULAR HEMOGLOBIN: 31.7 PG (ref 27–31.2)
NT PRO BRAIN NATRIURETIC PEP.: 625 PG/ML (ref 0–125)
PLATELET # BLD: 265 K/MM3 (ref 142–424)
POTASSIUM: 3.4 MMOL/L (ref 3.5–5.1)
RBC # BLD AUTO: 5.13 M/MM3 (ref 4.6–6.2)
SODIUM SPEC-SCNC: 139 MMOL/L (ref 136–145)
TROPONIN I: 0.01 NG/ML (ref 0–0.03)
TROPONIN I: 1.04 NG/ML (ref 0–0.03)
TROPONIN I: 4.7 NG/ML (ref 0–0.03)
WBC # BLD AUTO: 13.3 K/MM3 (ref 4.8–10.8)

## 2022-04-23 PROCEDURE — C1875 STENT, COATED/COV W/O DEL SY: HCPCS

## 2022-04-23 PROCEDURE — 99152 MOD SED SAME PHYS/QHP 5/>YRS: CPT

## 2022-04-23 PROCEDURE — 93458 L HRT ARTERY/VENTRICLE ANGIO: CPT

## 2022-04-23 PROCEDURE — 92941 PRQ TRLML REVSC TOT OCCL AMI: CPT

## 2022-04-23 PROCEDURE — C9606 PERC D-E COR REVASC W AMI S: HCPCS

## 2022-04-23 PROCEDURE — C1769 GUIDE WIRE: HCPCS

## 2022-04-23 PROCEDURE — C9803 HOPD COVID-19 SPEC COLLECT: HCPCS

## 2022-04-23 PROCEDURE — 92928 PRQ TCAT PLMT NTRAC ST 1 LES: CPT

## 2022-04-23 PROCEDURE — 99153 MOD SED SAME PHYS/QHP EA: CPT

## 2022-04-23 PROCEDURE — 80048 BASIC METABOLIC PNL TOTAL CA: CPT

## 2022-04-23 PROCEDURE — C1876 STENT, NON-COA/NON-COV W/DEL: HCPCS

## 2022-04-23 PROCEDURE — 71275 CT ANGIOGRAPHY CHEST: CPT

## 2022-04-23 PROCEDURE — 82962 GLUCOSE BLOOD TEST: CPT

## 2022-04-23 PROCEDURE — U0003 INFECTIOUS AGENT DETECTION BY NUCLEIC ACID (DNA OR RNA); SEVERE ACUTE RESPIRATORY SYNDROME CORONAVIRUS 2 (SARS-COV-2) (CORONAVIRUS DISEASE [COVID-19]), AMPLIFIED PROBE TECHNIQUE, MAKING USE OF HIGH THROUGHPUT TECHNOLOGIES AS DESCRIBED BY CMS-2020-01-R: HCPCS

## 2022-04-23 PROCEDURE — G0378 HOSPITAL OBSERVATION PER HR: HCPCS

## 2022-04-23 PROCEDURE — 85007 BL SMEAR W/DIFF WBC COUNT: CPT

## 2022-04-23 PROCEDURE — 93005 ELECTROCARDIOGRAM TRACING: CPT

## 2022-04-23 PROCEDURE — 83880 ASSAY OF NATRIURETIC PEPTIDE: CPT

## 2022-04-23 PROCEDURE — 83735 ASSAY OF MAGNESIUM: CPT

## 2022-04-23 PROCEDURE — 36415 COLL VENOUS BLD VENIPUNCTURE: CPT

## 2022-04-23 PROCEDURE — 85025 COMPLETE CBC W/AUTO DIFF WBC: CPT

## 2022-04-23 PROCEDURE — 99285 EMERGENCY DEPT VISIT HI MDM: CPT

## 2022-04-23 PROCEDURE — 80061 LIPID PANEL: CPT

## 2022-04-23 PROCEDURE — C1725 CATH, TRANSLUMIN NON-LASER: HCPCS

## 2022-04-23 PROCEDURE — 84484 ASSAY OF TROPONIN QUANT: CPT

## 2022-04-23 PROCEDURE — C9600 PERC DRUG-EL COR STENT SING: HCPCS

## 2022-04-23 PROCEDURE — 85347 COAGULATION TIME ACTIVATED: CPT

## 2022-04-23 PROCEDURE — 71045 X-RAY EXAM CHEST 1 VIEW: CPT

## 2022-04-23 PROCEDURE — 93308 TTE F-UP OR LMTD: CPT

## 2022-04-23 PROCEDURE — U0005 INFEC AGEN DETEC AMPLI PROBE: HCPCS

## 2022-04-23 NOTE — PC.NURSE
2000 troponin level 4.7 called to dr stevenson, dr stevenson states to let cardiology know, ekg ordered, dr soler was called with troponin 4.7 repeated and verified, also reported b/p 190/100, orders received for metoprolol p.o. 50mg now and then every

## 2022-04-23 NOTE — PC.NURSE
2130 b/p 131/55 after metoprolol and reported to dr stevenson, dr stevenson stated to hold nitropaste for now and give if pain or pressure worsens, pt stated a little tightness but not as bad as it was.

## 2022-04-23 NOTE — PC.NURSE
dr stevenson made aware that pt is diabetic and takes insulin at home along with p.o.; orders received for low intensity sliding scale, fsbs ac/hs repeated and verified.

## 2022-04-23 NOTE — PC.NURSE
Notifed by ER that 2-10 mg oxycodone IR and 2-400 mg gabapentin are needed from the [facility]ni for patient. Meds pulled and sent to ER through pneumatic tube system. Dina in ER notified.

## 2022-04-23 NOTE — PC.NURSE
2120 dr stevenson called with pt complaints of feeling anxious and wanting something to help, pt c/o soa and slight chest tightness, orders received for nitropaste 1/2 inch to cw if b/p is stable after metoprolol, ativan 0.5mg order recieved x1 dose as

## 2022-04-24 VITALS
HEART RATE: 60 BPM | TEMPERATURE: 98.5 F | RESPIRATION RATE: 18 BRPM | DIASTOLIC BLOOD PRESSURE: 48 MMHG | OXYGEN SATURATION: 96 % | SYSTOLIC BLOOD PRESSURE: 100 MMHG

## 2022-04-24 VITALS
SYSTOLIC BLOOD PRESSURE: 154 MMHG | DIASTOLIC BLOOD PRESSURE: 72 MMHG | RESPIRATION RATE: 18 BRPM | OXYGEN SATURATION: 95 % | TEMPERATURE: 98.24 F | HEART RATE: 70 BPM

## 2022-04-24 VITALS
HEART RATE: 62 BPM | TEMPERATURE: 97.52 F | SYSTOLIC BLOOD PRESSURE: 121 MMHG | DIASTOLIC BLOOD PRESSURE: 64 MMHG | OXYGEN SATURATION: 94 % | RESPIRATION RATE: 16 BRPM

## 2022-04-24 VITALS — DIASTOLIC BLOOD PRESSURE: 96 MMHG | RESPIRATION RATE: 16 BRPM | HEART RATE: 86 BPM | SYSTOLIC BLOOD PRESSURE: 196 MMHG

## 2022-04-24 VITALS
OXYGEN SATURATION: 95 % | TEMPERATURE: 98.42 F | HEART RATE: 78 BPM | RESPIRATION RATE: 17 BRPM | DIASTOLIC BLOOD PRESSURE: 88 MMHG | SYSTOLIC BLOOD PRESSURE: 148 MMHG

## 2022-04-24 VITALS
OXYGEN SATURATION: 92 % | TEMPERATURE: 97.9 F | DIASTOLIC BLOOD PRESSURE: 64 MMHG | SYSTOLIC BLOOD PRESSURE: 94 MMHG | RESPIRATION RATE: 18 BRPM | HEART RATE: 67 BPM

## 2022-04-24 VITALS
SYSTOLIC BLOOD PRESSURE: 99 MMHG | TEMPERATURE: 97.52 F | HEART RATE: 62 BPM | DIASTOLIC BLOOD PRESSURE: 58 MMHG | RESPIRATION RATE: 18 BRPM | OXYGEN SATURATION: 92 %

## 2022-04-24 VITALS — DIASTOLIC BLOOD PRESSURE: 100 MMHG | HEART RATE: 83 BPM | SYSTOLIC BLOOD PRESSURE: 175 MMHG

## 2022-04-24 VITALS
OXYGEN SATURATION: 96 % | SYSTOLIC BLOOD PRESSURE: 144 MMHG | HEART RATE: 83 BPM | DIASTOLIC BLOOD PRESSURE: 91 MMHG | RESPIRATION RATE: 18 BRPM | TEMPERATURE: 98.42 F

## 2022-04-24 VITALS — HEART RATE: 60 BPM

## 2022-04-24 VITALS — HEART RATE: 50 BPM

## 2022-04-24 VITALS — HEART RATE: 70 BPM

## 2022-04-24 LAB
ANION GAP SERPL CALC-SCNC: 9.3 MEQ/L (ref 5–15)
BUN SERPL-MCNC: 4 MG/DL (ref 9–20)
CALCIUM SPEC-MCNC: 8.7 MG/DL (ref 8.4–10.2)
CELLS COUNTED: 100
CHLORIDE SPEC-SCNC: 106 MMOL/L (ref 98–107)
CO2 SERPL-SCNC: 28 MMOL/L (ref 22–30)
CREAT BLD-SCNC: 0.6 MG/DL (ref 0.66–1.25)
CREATININE CLEARANCE ESTIMATED: 9 ML/MIN (ref 50–200)
ESTIMATED GLOMERULAR FILT RATE: 137 ML/MIN (ref 60–?)
GFR (AFRICAN AMERICAN): 166 ML/MIN (ref 60–?)
GLUCOSE BLDC GLUCOMTR-MCNC: 182 MG/DL (ref 70–110)
GLUCOSE: 186 MG/DL (ref 74–100)
HCT VFR BLD CALC: 49.4 % (ref 42–52)
HGB BLD-MCNC: 16.7 G/DL (ref 14.1–18)
MAGNESIUM: 1.8 MG/DL (ref 1.6–2.3)
MANUAL DIFFERENTIAL: (no result)
MCHC RBC-ENTMCNC: 33.7 G/DL (ref 31.8–35.4)
MCV RBC: 92.4 FL (ref 80–94)
MEAN CORPUSCULAR HEMOGLOBIN: 31.2 PG (ref 27–31.2)
PLATELET # BLD: 282 K/MM3 (ref 142–424)
POTASSIUM: 3.3 MMOL/L (ref 3.5–5.1)
RBC # BLD AUTO: 5.35 M/MM3 (ref 4.6–6.2)
SODIUM SPEC-SCNC: 140 MMOL/L (ref 136–145)
WBC # BLD AUTO: 17.1 K/MM3 (ref 4.8–10.8)

## 2022-04-24 NOTE — PC.NURSE
PT B/P ELEVATED AND MEDICATED WITH METOPROLOL, PT COMPLAINS OF CHEST PRESSURE AND TIGHTNESS AS MILD, PT WAS GIVEN 1L 02 PNC FOR COMFORT BUT CHOSE NOT TO WEAR, OXYCONTIN PO GIVEN AS ORDERED, PT ANXIOUS ABOUT POSSIBLE CATH LAB, TELE NSR

## 2022-04-25 VITALS
OXYGEN SATURATION: 93 % | HEART RATE: 76 BPM | DIASTOLIC BLOOD PRESSURE: 73 MMHG | TEMPERATURE: 98.06 F | RESPIRATION RATE: 20 BRPM | SYSTOLIC BLOOD PRESSURE: 114 MMHG

## 2022-04-25 VITALS
OXYGEN SATURATION: 94 % | HEART RATE: 60 BPM | DIASTOLIC BLOOD PRESSURE: 52 MMHG | RESPIRATION RATE: 17 BRPM | SYSTOLIC BLOOD PRESSURE: 90 MMHG

## 2022-04-25 VITALS
OXYGEN SATURATION: 90 % | SYSTOLIC BLOOD PRESSURE: 90 MMHG | DIASTOLIC BLOOD PRESSURE: 55 MMHG | RESPIRATION RATE: 20 BRPM | HEART RATE: 71 BPM | TEMPERATURE: 98.3 F

## 2022-04-25 VITALS
HEART RATE: 75 BPM | OXYGEN SATURATION: 92 % | SYSTOLIC BLOOD PRESSURE: 96 MMHG | DIASTOLIC BLOOD PRESSURE: 62 MMHG | RESPIRATION RATE: 16 BRPM

## 2022-04-25 VITALS
HEART RATE: 70 BPM | OXYGEN SATURATION: 95 % | RESPIRATION RATE: 18 BRPM | DIASTOLIC BLOOD PRESSURE: 68 MMHG | SYSTOLIC BLOOD PRESSURE: 89 MMHG

## 2022-04-25 VITALS
SYSTOLIC BLOOD PRESSURE: 105 MMHG | OXYGEN SATURATION: 94 % | RESPIRATION RATE: 18 BRPM | DIASTOLIC BLOOD PRESSURE: 74 MMHG | HEART RATE: 74 BPM

## 2022-04-25 VITALS
DIASTOLIC BLOOD PRESSURE: 54 MMHG | OXYGEN SATURATION: 100 % | SYSTOLIC BLOOD PRESSURE: 100 MMHG | HEART RATE: 71 BPM | RESPIRATION RATE: 16 BRPM

## 2022-04-25 VITALS
DIASTOLIC BLOOD PRESSURE: 79 MMHG | HEART RATE: 67 BPM | SYSTOLIC BLOOD PRESSURE: 114 MMHG | OXYGEN SATURATION: 94 % | RESPIRATION RATE: 16 BRPM | TEMPERATURE: 99 F

## 2022-04-25 VITALS
RESPIRATION RATE: 16 BRPM | DIASTOLIC BLOOD PRESSURE: 57 MMHG | SYSTOLIC BLOOD PRESSURE: 91 MMHG | HEART RATE: 62 BPM | OXYGEN SATURATION: 91 %

## 2022-04-25 VITALS
HEART RATE: 75 BPM | RESPIRATION RATE: 16 BRPM | SYSTOLIC BLOOD PRESSURE: 92 MMHG | OXYGEN SATURATION: 100 % | DIASTOLIC BLOOD PRESSURE: 62 MMHG

## 2022-04-25 VITALS
SYSTOLIC BLOOD PRESSURE: 110 MMHG | HEART RATE: 71 BPM | OXYGEN SATURATION: 92 % | DIASTOLIC BLOOD PRESSURE: 72 MMHG | RESPIRATION RATE: 18 BRPM | TEMPERATURE: 97.9 F

## 2022-04-25 VITALS
SYSTOLIC BLOOD PRESSURE: 90 MMHG | OXYGEN SATURATION: 95 % | TEMPERATURE: 98.06 F | RESPIRATION RATE: 20 BRPM | HEART RATE: 67 BPM | DIASTOLIC BLOOD PRESSURE: 57 MMHG

## 2022-04-25 VITALS
OXYGEN SATURATION: 95 % | TEMPERATURE: 98.06 F | RESPIRATION RATE: 16 BRPM | DIASTOLIC BLOOD PRESSURE: 97 MMHG | HEART RATE: 74 BPM | SYSTOLIC BLOOD PRESSURE: 128 MMHG

## 2022-04-25 VITALS
DIASTOLIC BLOOD PRESSURE: 62 MMHG | HEART RATE: 75 BPM | RESPIRATION RATE: 18 BRPM | OXYGEN SATURATION: 92 % | SYSTOLIC BLOOD PRESSURE: 100 MMHG

## 2022-04-25 VITALS
RESPIRATION RATE: 20 BRPM | DIASTOLIC BLOOD PRESSURE: 83 MMHG | HEART RATE: 68 BPM | SYSTOLIC BLOOD PRESSURE: 119 MMHG | OXYGEN SATURATION: 92 %

## 2022-04-25 VITALS
SYSTOLIC BLOOD PRESSURE: 94 MMHG | RESPIRATION RATE: 20 BRPM | OXYGEN SATURATION: 92 % | HEART RATE: 61 BPM | DIASTOLIC BLOOD PRESSURE: 54 MMHG

## 2022-04-25 VITALS
SYSTOLIC BLOOD PRESSURE: 92 MMHG | RESPIRATION RATE: 16 BRPM | OXYGEN SATURATION: 90 % | DIASTOLIC BLOOD PRESSURE: 53 MMHG | HEART RATE: 75 BPM

## 2022-04-25 VITALS
RESPIRATION RATE: 16 BRPM | OXYGEN SATURATION: 100 % | DIASTOLIC BLOOD PRESSURE: 82 MMHG | HEART RATE: 66 BPM | SYSTOLIC BLOOD PRESSURE: 128 MMHG

## 2022-04-25 VITALS
RESPIRATION RATE: 18 BRPM | OXYGEN SATURATION: 95 % | DIASTOLIC BLOOD PRESSURE: 66 MMHG | HEART RATE: 68 BPM | SYSTOLIC BLOOD PRESSURE: 110 MMHG

## 2022-04-25 VITALS — HEART RATE: 60 BPM | OXYGEN SATURATION: 92 %

## 2022-04-25 VITALS — HEART RATE: 60 BPM

## 2022-04-25 VITALS — HEART RATE: 78 BPM

## 2022-04-25 LAB
CATHL ACTIVATED CLOTTING TIME: > 400 SEC (ref 74–125)
GLUCOSE BLDC GLUCOMTR-MCNC: 102 MG/DL (ref 70–110)
GLUCOSE BLDC GLUCOMTR-MCNC: 105 MG/DL (ref 70–110)
GLUCOSE BLDC GLUCOMTR-MCNC: 202 MG/DL (ref 70–110)
GLUCOSE BLDC GLUCOMTR-MCNC: 225 MG/DL (ref 70–110)
GLUCOSE BLDC GLUCOMTR-MCNC: 87 MG/DL (ref 70–110)

## 2022-04-25 PROCEDURE — 027135Z DILATION OF CORONARY ARTERY, TWO ARTERIES WITH TWO DRUG-ELUTING INTRALUMINAL DEVICES, PERCUTANEOUS APPROACH: ICD-10-PCS | Performed by: INTERNAL MEDICINE

## 2022-04-26 VITALS
HEART RATE: 80 BPM | DIASTOLIC BLOOD PRESSURE: 58 MMHG | OXYGEN SATURATION: 93 % | RESPIRATION RATE: 14 BRPM | TEMPERATURE: 98 F | SYSTOLIC BLOOD PRESSURE: 115 MMHG

## 2022-04-26 VITALS
TEMPERATURE: 98.06 F | SYSTOLIC BLOOD PRESSURE: 108 MMHG | OXYGEN SATURATION: 95 % | RESPIRATION RATE: 16 BRPM | HEART RATE: 86 BPM | DIASTOLIC BLOOD PRESSURE: 60 MMHG

## 2022-04-26 VITALS
SYSTOLIC BLOOD PRESSURE: 147 MMHG | HEART RATE: 60 BPM | RESPIRATION RATE: 17 BRPM | DIASTOLIC BLOOD PRESSURE: 72 MMHG | OXYGEN SATURATION: 94 % | TEMPERATURE: 97.8 F

## 2022-04-26 LAB
ANION GAP SERPL CALC-SCNC: 8.1 MEQ/L (ref 5–15)
BUN SERPL-MCNC: 13 MG/DL (ref 9–20)
CALCIUM SPEC-MCNC: 9.5 MG/DL (ref 8.4–10.2)
CHLORIDE SPEC-SCNC: 104 MMOL/L (ref 98–107)
CHOLEST SPEC-SCNC: 225 MG/DL (ref 140–200)
CO2 SERPL-SCNC: 31 MMOL/L (ref 22–30)
CREAT BLD-SCNC: 0.8 MG/DL (ref 0.66–1.25)
CREATININE CLEARANCE ESTIMATED: 9 ML/MIN (ref 50–200)
ESTIMATED GLOMERULAR FILT RATE: 98 ML/MIN (ref 60–?)
GFR (AFRICAN AMERICAN): 119 ML/MIN (ref 60–?)
GLUCOSE BLDC GLUCOMTR-MCNC: 105 MG/DL (ref 70–110)
GLUCOSE BLDC GLUCOMTR-MCNC: 171 MG/DL (ref 70–110)
GLUCOSE: 128 MG/DL (ref 74–100)
HCT VFR BLD CALC: 48.5 % (ref 42–52)
HDLC SERPL-MCNC: 21 MG/DL (ref 40–60)
HGB BLD-MCNC: 15.9 G/DL (ref 14.1–18)
MCHC RBC-ENTMCNC: 32.9 G/DL (ref 31.8–35.4)
MCV RBC: 94.8 FL (ref 80–94)
MEAN CORPUSCULAR HEMOGLOBIN: 31.2 PG (ref 27–31.2)
PLATELET # BLD: 277 K/MM3 (ref 142–424)
POTASSIUM: 4.1 MMOL/L (ref 3.5–5.1)
RBC # BLD AUTO: 5.12 M/MM3 (ref 4.6–6.2)
SODIUM SPEC-SCNC: 139 MMOL/L (ref 136–145)
TRIGLYCERIDES: 233 MG/DL (ref 30–150)
WBC # BLD AUTO: 11.7 K/MM3 (ref 4.8–10.8)

## 2022-04-26 NOTE — PC.NURSE
pt was lethargic and hypotensive at beginning of shift, rested t/o most of shift, has remained on room air with O2 sats 91-95, HR 60-86, systolic BP , has complained of pain in neck and back, right radial cath site with dressing in place,

## 2022-04-27 NOTE — CARE MANAGER
Spoke with patient in follow-up phone interview post discharge, patient states he has follow-up appointments and medications prescribed. No further needs at this time.